# Patient Record
Sex: FEMALE | Employment: OTHER | URBAN - METROPOLITAN AREA
[De-identification: names, ages, dates, MRNs, and addresses within clinical notes are randomized per-mention and may not be internally consistent; named-entity substitution may affect disease eponyms.]

---

## 2018-09-07 ENCOUNTER — HOSPITAL ENCOUNTER (EMERGENCY)
Age: 43
Discharge: HOME | End: 2018-09-07
Attending: EMERGENCY MEDICINE
Payer: MEDICARE

## 2018-09-07 ENCOUNTER — APPOINTMENT (OUTPATIENT)
Dept: GENERAL RADIOLOGY | Age: 43
End: 2018-09-07
Attending: NURSE PRACTITIONER
Payer: MEDICARE

## 2018-09-07 VITALS
HEIGHT: 65 IN | TEMPERATURE: 98.3 F | HEART RATE: 70 BPM | RESPIRATION RATE: 16 BRPM | WEIGHT: 237 LBS | DIASTOLIC BLOOD PRESSURE: 101 MMHG | SYSTOLIC BLOOD PRESSURE: 148 MMHG | OXYGEN SATURATION: 100 % | BODY MASS INDEX: 39.49 KG/M2

## 2018-09-07 DIAGNOSIS — J02.9 SORE THROAT: ICD-10-CM

## 2018-09-07 DIAGNOSIS — J01.10 ACUTE NON-RECURRENT FRONTAL SINUSITIS: ICD-10-CM

## 2018-09-07 DIAGNOSIS — J06.9 URI WITH COUGH AND CONGESTION: ICD-10-CM

## 2018-09-07 DIAGNOSIS — H65.92 LEFT NON-SUPPURATIVE OTITIS MEDIA: Primary | ICD-10-CM

## 2018-09-07 PROCEDURE — 71046 X-RAY EXAM CHEST 2 VIEWS: CPT

## 2018-09-07 PROCEDURE — 99282 EMERGENCY DEPT VISIT SF MDM: CPT

## 2018-09-07 RX ORDER — BENZONATATE 100 MG/1
100 CAPSULE ORAL
Qty: 30 CAP | Refills: 0 | Status: SHIPPED | OUTPATIENT
Start: 2018-09-07 | End: 2018-09-14

## 2018-09-07 RX ORDER — OXYCODONE AND ACETAMINOPHEN 7.5; 325 MG/1; MG/1
TABLET ORAL
COMMUNITY
End: 2020-02-02

## 2018-09-07 RX ORDER — AZITHROMYCIN 250 MG/1
TABLET, FILM COATED ORAL
Qty: 6 TAB | Refills: 0 | Status: SHIPPED | OUTPATIENT
Start: 2018-09-07 | End: 2018-09-12

## 2018-09-07 NOTE — ED TRIAGE NOTES
C/o productive cough with yellow/green sputum, sore throat, nasal congestion, headaches, chills x3 days.

## 2018-09-08 NOTE — DISCHARGE INSTRUCTIONS
Drinking warm liquids, gargling with warm salt water, and throat lozenges may help with symptoms. An allergy medication combined with a decongestant (Allegra-D, Claritin-D, etc) should be taken daily. Saline nasal sprays or Net-Pots can be purchased over the counter to help reduce nasal congestion. Get rest and take a multivitamin daily to boost the immune system. Return to ED for any worsening or new symptoms such as throat swelling, high fevers, shortness of breath, vomiting, or if symptoms do not improve. Saline Nasal Washes: Care Instructions  Your Care Instructions  Saline nasal washes help keep the nasal passages open by washing out thick or dried mucus. This simple remedy can help relieve symptoms of allergies, sinusitis, and colds. It also can make the nose feel more comfortable by keeping the mucous membranes moist. You may notice a little burning sensation in your nose the first few times you use the solution, but this usually gets better in a few days. Follow-up care is a key part of your treatment and safety. Be sure to make and go to all appointments, and call your doctor if you are having problems. It's also a good idea to know your test results and keep a list of the medicines you take. How can you care for yourself at home? · You can buy premixed saline solution in a squeeze bottle or other sinus rinse products at a drugstore. Read and follow the instructions on the label. · You also can make your own saline solution by adding 1 teaspoon of salt and 1 teaspoon of baking soda to 2 cups of distilled water. · If you use a homemade solution, pour a small amount into a clean bowl. Using a rubber bulb syringe, squeeze the syringe and place the tip in the salt water. Pull a small amount of the salt water into the syringe by relaxing your hand. · Sit down with your head tilted slightly back. Do not lie down.  Put the tip of the bulb syringe or the squeeze bottle a little way into one of your nostrils. Gently drip or squirt a few drops into the nostril. Repeat with the other nostril. Some sneezing and gagging are normal at first.  · Gently blow your nose. · Wipe the syringe or bottle tip clean after each use. · Repeat this 2 or 3 times a day. · Use nasal washes gently if you have nosebleeds often. When should you call for help? Watch closely for changes in your health, and be sure to contact your doctor if:    · You often get nosebleeds.     · You have problems doing the nasal washes. Where can you learn more? Go to http://jenna-lola.info/. Enter 549 [id number] [id number] 47 in the search box to learn more about \"Saline Nasal Washes: Care Instructions. \"  Current as of: May 12, 2017  Content Version: 11.7  © 4720-9681 [employer]. Care instructions adapted under license by [employer] (which disclaims liability or warranty for this information). If you have questions about a medical condition or this instruction, always ask your healthcare professional. [place] [address] any warranty or liability for your use of this information. Sore Throat: Care Instructions  Your Care Instructions    Infection by bacteria or a virus causes most sore throats. Cigarette smoke, dry air, air pollution, allergies, and yelling can also cause a sore throat. Sore throats can be painful and annoying. Fortunately, most sore throats go away on their own. If you have a bacterial infection, your doctor may prescribe antibiotics. Follow-up care is a key part of your treatment and safety. Be sure to make and go to all appointments, and call your doctor if you are having problems. It's also a good idea to know your test results and keep a list of the medicines you take. How can you care for yourself at home? · If your doctor prescribed antibiotics, take them as directed. Do not stop taking them just because you feel better.  You need to take the full course of antibiotics. · Gargle with warm salt water once an hour to help reduce swelling and relieve discomfort. Use 1 teaspoon of salt mixed in 1 cup of warm water. · Take an over-the-counter pain medicine, such as acetaminophen (Tylenol), ibuprofen (Advil, Motrin), or naproxen (Aleve). Read and follow all instructions on the label. · Be careful when taking over-the-counter cold or flu medicines and Tylenol at the same time. Many of these medicines have acetaminophen, which is Tylenol. Read the labels to make sure that you are not taking more than the recommended dose. Too much acetaminophen (Tylenol) can be harmful. · Drink plenty of fluids. Fluids may help soothe an irritated throat. Hot fluids, such as tea or soup, may help decrease throat pain. · Use over-the-counter throat lozenges to soothe pain. Regular cough drops or hard candy may also help. These should not be given to young children because of the risk of choking. · Do not smoke or allow others to smoke around you. If you need help quitting, talk to your doctor about stop-smoking programs and medicines. These can increase your chances of quitting for good. · Use a vaporizer or humidifier to add moisture to your bedroom. Follow the directions for cleaning the machine. When should you call for help? Call your doctor now or seek immediate medical care if:    · You have new or worse trouble swallowing.     · Your sore throat gets much worse on one side.    Watch closely for changes in your health, and be sure to contact your doctor if you do not get better as expected. Where can you learn more? Go to http://jenna-lola.info/. Enter [phone] [id number] [phone] in the search box to learn more about \"Sore Throat: Care Instructions. \"  Current as of: May 12, 2017  Content Version: 11.7  © 9316-7444 [employer]. Care instructions adapted under license by [employer] (which disclaims liability or warranty for this information).  If you have questions about a medical condition or this instruction, always ask your healthcare professional. [place] [address] any warranty or liability for your use of this information.

## 2018-09-08 NOTE — ED PROVIDER NOTES
EMERGENCY DEPARTMENT HISTORY AND PHYSICAL EXAM 
 
9:44 PM 
 
 
Date: 9/7/2018 Patient Name: Power Velasquez History of Presenting Illness Chief Complaint Patient presents with  Cough  Headache History Provided By: Patient Chief Complaint: URI symptosm Duration: 3 Days Timing:  Acute Additional History (Context): Power Velasquez is a 41 y.o. female with No significant past medical history who presents with cough, ear pain, sore throat, headaches, sinus congestion x 3 days. Headaches are frontal and a/w sinuses. Pain is worse leaning over. She denies fevers. Her eyes are watering./  She has not taken any medication. She denies vomiting, diarrhea. PCP: Denis Sotelo MD 
 
Current Outpatient Prescriptions Medication Sig Dispense Refill  losartan/hydrochlorothiazide (HYZAAR PO) Take  by mouth.  oxyCODONE-acetaminophen (PERCOCET) 7.5-325 mg per tablet Take  by mouth.  GABAPENTIN PO Take  by mouth.  quetiapine fumarate (SEROQUEL PO) Take  by mouth.  tramadol HCl (TRAMADOL PO) Take  by mouth.  linaclotide (LINZESS PO) Take  by mouth.  celecoxib (CELEBREX PO) Take  by mouth.  azithromycin (ZITHROMAX Z-RENÉ) 250 mg tablet Take 2 tablets on day 1, and 1 tablet on days 2-5. 6 Tab 0  
 benzonatate (TESSALON PERLES) 100 mg capsule Take 1 Cap by mouth three (3) times daily as needed for Cough for up to 7 days. 30 Cap 0 Past History Past Medical History: 
Past Medical History:  
Diagnosis Date  ADHD  Bipolar disorder ([address]  Carpal tunnel syndrome  Fibromyalgia  Hypertension  Irregular heart beat  Rheumatoid arthritis ([address] Past Surgical History: 
Past Surgical History:  
Procedure Laterality Date  HX HYSTERECTOMY Family History: 
History reviewed. No pertinent family history. Social History: 
Social History Substance Use Topics  Smoking status: Current Every Day Smoker Packs/day: 0.25  Smokeless tobacco: None  Alcohol use Yes Comment: rarely Allergies: Allergies Allergen Reactions  Aspirin Hives  Pcn [Penicillins] Hives Review of Systems Review of Systems Constitutional: Negative for fever. HENT: Positive for congestion, ear pain, rhinorrhea, sinus pain, sinus pressure and sore throat. Negative for facial swelling. Eyes: Negative for visual disturbance. Respiratory: Positive for cough. Negative for shortness of breath and wheezing. Cardiovascular: Negative for chest pain. Gastrointestinal: Negative for abdominal pain, diarrhea, nausea and vomiting. Genitourinary: Negative for dysuria. Musculoskeletal: Negative for neck pain. Skin: Negative for rash. Neurological: Negative for dizziness. Psychiatric/Behavioral: Negative for confusion. All other systems reviewed and are negative. Physical Exam  
 
Visit Vitals  BP (!) 148/101 (BP 1 Location: Right arm, BP Patient Position: Sitting)  Pulse 70  Temp 98.3 °F (36.8 °C)  Resp 16  
 Ht 5' 5\" (1.651 m)  Wt 107.5 kg (237 lb)  SpO2 100%  BMI 39.44 kg/m2 Physical Exam  
Constitutional: She is oriented to person, place, and time. She appears well-developed and well-nourished. HENT:  
Head: Normocephalic. Right Ear: Tympanic membrane, external ear and ear canal normal.  
Left Ear: Tympanic membrane, external ear and ear canal normal.  
Nose: Nose normal. Right sinus exhibits no maxillary sinus tenderness and no frontal sinus tenderness. Left sinus exhibits no maxillary sinus tenderness and no frontal sinus tenderness. Mouth/Throat: Uvula is midline, oropharynx is clear and moist and mucous membranes are normal. No oropharyngeal exudate, posterior oropharyngeal edema, posterior oropharyngeal erythema or tonsillar abscesses.   
Left Otitis media; sinus tenderness frontal   
Eyes: Conjunctivae are normal.  
 Neck: Normal range of motion. Neck supple. Cardiovascular: Normal rate, regular rhythm and normal heart sounds. Pulmonary/Chest: Effort normal and breath sounds normal.  
Musculoskeletal: Normal range of motion. Lymphadenopathy:  
  She has no cervical adenopathy. Neurological: She is alert and oriented to person, place, and time. Skin: Skin is warm and dry. Psychiatric: She has a normal mood and affect. Nursing note and vitals reviewed. Diagnostic Study Results Labs - No results found for this or any previous visit (from the past 12 hour(s)). Radiologic Studies -  
XR CHEST PA LAT    (Results Pending) Medical Decision Making I am the first provider for this patient. I reviewed the vital signs, available nursing notes, past medical history, past surgical history, family history and social history. Vital Signs-Reviewed the patient's vital signs. Records Reviewed: Nursing Notes (Time of Review: 9:44 PM) 
 
ED Course: Progress Notes, Reevaluation, and Consults: 
 
 
Provider Notes (Medical Decision Making): MDM Number of Diagnoses or Management Options Acute non-recurrent frontal sinusitis:  
Left non-suppurative otitis media:  
Sore throat: URI with cough and congestion:  
Diagnosis management comments: Left OM. Sinus tenderness. Afebrile. Cough productive./   Cover with abx. Diagnosis Clinical Impression: 1. Left non-suppurative otitis media 2. URI with cough and congestion 3. Acute non-recurrent frontal sinusitis 4. Sore throat Disposition:  
 
Follow-up Information Follow up With Details Comments Contact Info Zacarias Law MD In 3 days If symptoms do not improve [address] [facility] 
[address] [facility] [address] 
[phone] [facility] EMERGENCY DEPT  Immediately if symptoms worsen [address] 
[phone] Discharge Medication List as of 9/7/2018  9:22 PM  
  
 START taking these medications Details  
azithromycin (ZITHROMAX Z-RENÉ) 250 mg tablet Take 2 tablets on day 1, and 1 tablet on days 2-5., Print, Disp-6 Tab, R-0  
  
benzonatate (TESSALON PERLES) 100 mg capsule Take 1 Cap by mouth three (3) times daily as needed for Cough for up to 7 days. , Print, Disp-30 Cap, R-0  
  
  
CONTINUE these medications which have NOT CHANGED Details  
losartan/hydrochlorothiazide (HYZAAR PO) Take  by mouth., Historical Med  
  
oxyCODONE-acetaminophen (PERCOCET) 7.5-325 mg per tablet Take  by mouth., Historical Med GABAPENTIN PO Take  by mouth., Historical Med  
  
quetiapine fumarate (SEROQUEL PO) Take  by mouth., Historical Med  
  
tramadol HCl (TRAMADOL PO) Take  by mouth., Historical Med  
  
linaclotide (LINZESS PO) Take  by mouth., Historical Med  
  
celecoxib (CELEBREX PO) Take  by mouth., Historical Med  
  
  
 
_______________________________ Attestations: 
Scribe Attestation Teddy Manley PA-C acting as a scribe for and in the presence of [employer], [employer] September 07, 2018 at 9:47 PM 
    
Provider Attestation:     
I personally performed the services described in the documentation, reviewed the documentation, as recorded by the scribe in my presence, and it accurately and completely records my words and actions. September 07, 2018 at 9:47 PM - SILAS Lovell 
_______________________________

## 2018-11-24 ENCOUNTER — HOSPITAL ENCOUNTER (EMERGENCY)
Age: 43
Discharge: HOME | End: 2018-11-24
Attending: EMERGENCY MEDICINE
Payer: MEDICARE

## 2018-11-24 ENCOUNTER — APPOINTMENT (OUTPATIENT)
Dept: GENERAL RADIOLOGY | Age: 43
End: 2018-11-24
Attending: EMERGENCY MEDICINE
Payer: MEDICARE

## 2018-11-24 VITALS
HEART RATE: 72 BPM | TEMPERATURE: 98.2 F | OXYGEN SATURATION: 100 % | RESPIRATION RATE: 18 BRPM | SYSTOLIC BLOOD PRESSURE: 171 MMHG | HEIGHT: 65 IN | WEIGHT: 237 LBS | DIASTOLIC BLOOD PRESSURE: 93 MMHG | BODY MASS INDEX: 39.49 KG/M2

## 2018-11-24 DIAGNOSIS — R07.9 CHEST PAIN, UNSPECIFIED TYPE: Primary | ICD-10-CM

## 2018-11-24 LAB
ALBUMIN SERPL-MCNC: 3.3 G/DL (ref 3.4–5)
ALBUMIN/GLOB SERPL: 0.9 {RATIO} (ref 0.8–1.7)
ALP SERPL-CCNC: 101 U/L (ref 45–117)
ALT SERPL-CCNC: 20 U/L (ref 13–56)
ANION GAP SERPL CALC-SCNC: 8 MMOL/L (ref 3–18)
AST SERPL-CCNC: 12 U/L (ref 15–37)
BASOPHILS # BLD: 0.1 K/UL (ref 0–0.1)
BASOPHILS NFR BLD: 0 % (ref 0–2)
BILIRUB SERPL-MCNC: 0.2 MG/DL (ref 0.2–1)
BUN SERPL-MCNC: 14 MG/DL (ref 7–18)
BUN/CREAT SERPL: 11 (ref 12–20)
CALCIUM SERPL-MCNC: 8.1 MG/DL (ref 8.5–10.1)
CHLORIDE SERPL-SCNC: 110 MMOL/L (ref 100–108)
CK SERPL-CCNC: 99 U/L (ref 26–192)
CO2 SERPL-SCNC: 25 MMOL/L (ref 21–32)
CREAT SERPL-MCNC: 1.22 MG/DL (ref 0.6–1.3)
DIFFERENTIAL METHOD BLD: ABNORMAL
EOSINOPHIL # BLD: 0.4 K/UL (ref 0–0.4)
EOSINOPHIL NFR BLD: 4 % (ref 0–5)
ERYTHROCYTE [DISTWIDTH] IN BLOOD BY AUTOMATED COUNT: 12.9 % (ref 11.6–14.5)
GLOBULIN SER CALC-MCNC: 3.5 G/DL (ref 2–4)
GLUCOSE SERPL-MCNC: 123 MG/DL (ref 74–99)
HCG SERPL-ACNC: <1 MIU/ML (ref 0–10)
HCT VFR BLD AUTO: 41.2 % (ref 35–45)
HGB BLD-MCNC: 14 G/DL (ref 12–16)
LYMPHOCYTES # BLD: 4.4 K/UL (ref 0.9–3.6)
LYMPHOCYTES NFR BLD: 38 % (ref 21–52)
MCH RBC QN AUTO: 30.5 PG (ref 24–34)
MCHC RBC AUTO-ENTMCNC: 34 G/DL (ref 31–37)
MCV RBC AUTO: 89.8 FL (ref 74–97)
MONOCYTES # BLD: 0.6 K/UL (ref 0.05–1.2)
MONOCYTES NFR BLD: 5 % (ref 3–10)
NEUTS SEG # BLD: 6 K/UL (ref 1.8–8)
NEUTS SEG NFR BLD: 53 % (ref 40–73)
PLATELET # BLD AUTO: 263 K/UL (ref 135–420)
PMV BLD AUTO: 11.1 FL (ref 9.2–11.8)
POTASSIUM SERPL-SCNC: 3.3 MMOL/L (ref 3.5–5.5)
PROT SERPL-MCNC: 6.8 G/DL (ref 6.4–8.2)
RBC # BLD AUTO: 4.59 M/UL (ref 4.2–5.3)
SODIUM SERPL-SCNC: 143 MMOL/L (ref 136–145)
TROPONIN I SERPL-MCNC: <0.02 NG/ML (ref 0–0.04)
WBC # BLD AUTO: 11.5 K/UL (ref 4.6–13.2)

## 2018-11-24 PROCEDURE — 82550 ASSAY OF CK (CPK): CPT

## 2018-11-24 PROCEDURE — 71045 X-RAY EXAM CHEST 1 VIEW: CPT

## 2018-11-24 PROCEDURE — 99285 EMERGENCY DEPT VISIT HI MDM: CPT

## 2018-11-24 PROCEDURE — 84702 CHORIONIC GONADOTROPIN TEST: CPT

## 2018-11-24 PROCEDURE — 85025 COMPLETE CBC W/AUTO DIFF WBC: CPT

## 2018-11-24 PROCEDURE — 93005 ELECTROCARDIOGRAM TRACING: CPT

## 2018-11-24 PROCEDURE — 84484 ASSAY OF TROPONIN QUANT: CPT

## 2018-11-24 PROCEDURE — 80053 COMPREHEN METABOLIC PANEL: CPT

## 2018-11-24 RX ORDER — NITROGLYCERIN 0.4 MG/1
0.4 TABLET SUBLINGUAL
Status: DISCONTINUED | OUTPATIENT
Start: 2018-11-24 | End: 2018-11-24

## 2018-11-25 LAB
ATRIAL RATE: 63 BPM
CALCULATED P AXIS, ECG09: 37 DEGREES
CALCULATED R AXIS, ECG10: 25 DEGREES
CALCULATED T AXIS, ECG11: 27 DEGREES
DIAGNOSIS, 93000: NORMAL
P-R INTERVAL, ECG05: 168 MS
Q-T INTERVAL, ECG07: 448 MS
QRS DURATION, ECG06: 82 MS
QTC CALCULATION (BEZET), ECG08: 458 MS
VENTRICULAR RATE, ECG03: 63 BPM

## 2018-11-25 NOTE — ED PROVIDER NOTES
Emergency Department Treatment Report Patient: Sorin Spivey Age: 41 y.o. Sex: female YOB: 1975 Admit Date: 11/24/2018 PCP: Sonu Taylor MD  
MRN: [id number]  CSN: [id number] Room: CARY/CARY Time Dictated: 10:14 PM   
 
Chief Complaint Chief Complaint Patient presents with  Chest Pain History of Present Illness 41 y.o. female, PMH of HTN, Fibromyalgia, Bipolar Disorder, ADHD and irregular heartbeat presents with acute, moderate CP in the upper chest onset two hours ago. The patient claims that she feels a tightness in the chest that she has never experienced before. She took a stress test one month prior. The patient denies SOB. She has taken NTG before, which helps alleviate the pain, but ASA causes her to break out in hives. No other concerns or symptoms at this time. Review of Systems Constitutional: No fever, chills, or weight loss Eyes: No visual symptoms. ENT: No sore throat, runny nose or ear pain. Respiratory: No cough, dyspnea or wheezing. No SOB. Cardiovascular: Positive for chest pain and chset tightness. No pressure, palpitations, or heaviness. Gastrointestinal: No vomiting, diarrhea or abdominal pain. Genitourinary: No dysuria, frequency, or urgency. Musculoskeletal: No joint pain or swelling. Integumentary: No rashes. Neurological: No headaches, sensory or motor symptoms. Denies complaints in all other systems. Past Medical/Surgical History Past Medical History:  
Diagnosis Date  ADHD  Bipolar disorder ([facility] [address]  Carpal tunnel syndrome  Fibromyalgia  Hypertension  Irregular heart beat  Rheumatoid arthritis ([facility] [address] Past Surgical History:  
Procedure Laterality Date  HX HYSTERECTOMY Social History Social History Socioeconomic History  Marital status: SINGLE Spouse name: Not on file  Number of children: Not on file  Years of education: Not on file  Highest education level: Not on file Tobacco Use  Smoking status: Current Every Day Smoker Packs/day: 0.25 Substance and Sexual Activity  Alcohol use: Yes Comment: rarely  Drug use: No  
 
 
Family History History reviewed. No pertinent family history. Home Medications Prior to Admission Medications Prescriptions Last Dose Informant Patient Reported? Taking? GABAPENTIN PO   Yes No  
Sig: Take  by mouth. celecoxib (CELEBREX PO)   Yes No  
Sig: Take  by mouth.  
linaclotide (LINZESS PO)   Yes No  
Sig: Take  by mouth.  
losartan/hydrochlorothiazide (HYZAAR PO)   Yes No  
Sig: Take  by mouth. oxyCODONE-acetaminophen (PERCOCET) 7.5-325 mg per tablet   Yes No  
Sig: Take  by mouth.  
quetiapine fumarate (SEROQUEL PO)   Yes No  
Sig: Take  by mouth. tramadol HCl (TRAMADOL PO)   Yes No  
Sig: Take  by mouth. Facility-Administered Medications: None Allergies Allergies Allergen Reactions  Aspirin Hives  Pcn [Penicillins] Hives Physical Exam  
 
ED Triage Vitals [11/24/18 2204] ED Encounter Vitals Group BP (!) 162/98 Pulse (Heart Rate) 66 Resp Rate 18 Temp 98.2 °F (36.8 °C) Temp src O2 Sat (%) 100 % Weight 237 lb Height 5' 5\" Constitutional: Patient appears well developed and well nourished. Appearance and behavior are age and situation appropriate. HEENT: Conjunctiva clear. PERRLA. Mucous membranes moist, non-erythematous. Surface of the pharynx, palate, and tongue are pink, moist and without lesions. Neck: supple, non tender, symmetrical, no masses or JVD. Respiratory: lungs clear to auscultation, nonlabored respirations. No tachypnea or accessory muscle use. Cardiovascular: heart regular rate and rhythm without murmur rubs or gallops. Calves soft and non-tender. Distal pulses 2+ and equal bilaterally. No peripheral edema.   
Gastrointestinal:  Abdomen soft, nontender without complaint of pain to palpation Musculoskeletal: Nail beds pink with prompt capillary refill Integumentary: warm and dry without rashes or lesions Neurologic: alert and oriented, Sensation intact, motor strength equal and symmetric. No facial asymmetry or dysarthria. Diagnostic Studies Lab:  
Recent Results (from the past 12 hour(s)) EKG, 12 LEAD, INITIAL Collection Time: 11/24/18  9:57 PM  
Result Value Ref Range Ventricular Rate 63 BPM  
 Atrial Rate 63 BPM  
 P-R Interval 168 ms QRS Duration 82 ms Q-T Interval 448 ms QTC Calculation (Bezet) 458 ms Calculated P Axis 37 degrees Calculated R Axis 25 degrees Calculated T Axis 27 degrees Diagnosis Normal sinus rhythm Cannot rule out Anterior infarct , age undetermined Abnormal ECG No previous ECGs available CBC WITH AUTOMATED DIFF Collection Time: 11/24/18 10:05 PM  
Result Value Ref Range WBC 11.5 4.6 - 13.2 K/uL  
 RBC 4.59 4.20 - 5.30 M/uL  
 HGB 14.0 12.0 - 16.0 g/dL HCT 41.2 35.0 - 45.0 % MCV 89.8 74.0 - 97.0 FL  
 MCH 30.5 24.0 - 34.0 PG  
 MCHC 34.0 31.0 - 37.0 g/dL  
 RDW 12.9 11.6 - 14.5 % PLATELET 209 739 - 362 K/uL MPV 11.1 9.2 - 11.8 FL  
 NEUTROPHILS 53 40 - 73 % LYMPHOCYTES 38 21 - 52 % MONOCYTES 5 3 - 10 % EOSINOPHILS 4 0 - 5 % BASOPHILS 0 0 - 2 %  
 ABS. NEUTROPHILS 6.0 1.8 - 8.0 K/UL  
 ABS. LYMPHOCYTES 4.4 (H) 0.9 - 3.6 K/UL  
 ABS. MONOCYTES 0.6 0.05 - 1.2 K/UL  
 ABS. EOSINOPHILS 0.4 0.0 - 0.4 K/UL  
 ABS. BASOPHILS 0.1 0.0 - 0.1 K/UL  
 DF AUTOMATED METABOLIC PANEL, COMPREHENSIVE Collection Time: 11/24/18 10:05 PM  
Result Value Ref Range Sodium 143 136 - 145 mmol/L Potassium 3.3 (L) 3.5 - 5.5 mmol/L Chloride 110 (H) 100 - 108 mmol/L  
 CO2 25 21 - 32 mmol/L Anion gap 8 3.0 - 18 mmol/L Glucose 123 (H) 74 - 99 mg/dL BUN 14 7.0 - 18 MG/DL Creatinine 1.22 0.6 - 1.3 MG/DL  
 BUN/Creatinine ratio 11 (L) 12 - 20 GFR est AA 59 (L) >60 ml/min/1.73m2 GFR est non-AA 49 (L) >60 ml/min/1.73m2 Calcium 8.1 (L) 8.5 - 10.1 MG/DL Bilirubin, total 0.2 0.2 - 1.0 MG/DL  
 ALT (SGPT) 20 13 - 56 U/L  
 AST (SGOT) 12 (L) 15 - 37 U/L Alk. phosphatase 101 45 - 117 U/L Protein, total 6.8 6.4 - 8.2 g/dL Albumin 3.3 (L) 3.4 - 5.0 g/dL Globulin 3.5 2.0 - 4.0 g/dL A-G Ratio 0.9 0.8 - 1.7    
TROPONIN I Collection Time: 11/24/18 10:05 PM  
Result Value Ref Range Troponin-I, Qt. <0.02 0.0 - 0.045 NG/ML  
CK Collection Time: 11/24/18 10:05 PM  
Result Value Ref Range CK 99 26 - 192 U/L  
BETA HCG, QT Collection Time: 11/24/18 10:05 PM  
Result Value Ref Range Beta HCG, QT <1 0 - 10 MIU/ML Imaging: No results found. [facility] 
 
ED Course/Medical Decision Making Patient's chest pain is atypical in nature. Her HEART score is 2. She is PERC negative and symptoms are not typical for aortic dissection. Chest pain quickly resolved and she requested discharge. Instructed her to f/u with her PMD and Cardiologist. 
 
Medications - No data to display Final Diagnosis ICD-10-CM ICD-9-CM 1. Chest pain, unspecified type R07.9 786.50 Disposition Patient is discharged home in stable condition. Advised to follow with their primary care physician. Patient advised to return to the ER for any new or worsening symptoms. My Medications CONTINUE taking these medications Instructions Each Dose to Equal Morning Noon Evening Bedtime CELEBREX PO Your last dose was: Your next dose is: Take  by mouth. GABAPENTIN PO Your last dose was: Your next dose is: Take  by mouth. HYZAAR PO Your last dose was: Your next dose is: Take  by mouth. LINZESS PO Your last dose was: Your next dose is: Take  by mouth. PERCOCET 7.5-325 mg per tablet Generic drug:  oxyCODONE-acetaminophen Your last dose was: Your next dose is: Take  by mouth. SEROQUEL PO Your last dose was: Your next dose is: Take  by mouth. TRAMADOL PO Your last dose was: Your next dose is: Take  by mouth. Tila Gaspar MD 
November 24, 2018 My signature above authenticates this document and my orders, the final   
diagnosis (es), discharge prescription (s), and instructions in the Epic   
record. If you have any questions please contact [phone]. 
  
Nursing notes have been reviewed by the physician/ advanced practice   
Clinician. Scribe Attestation Tsering Hennessy acting as a scribe for and in the presence of Jayden Russo MD     
November 24, 2018 at 11:23 PM 
    
Provider Attestation:     
I personally performed the services described in the documentation, reviewed the documentation, as recorded by the scribe in my presence, and it accurately and completely records my words and actions.  November 24, 2018 at 11:23 PM - Jayden Russo MD

## 2018-11-25 NOTE — DISCHARGE INSTRUCTIONS

## 2018-11-25 NOTE — ED NOTES
Pt requested to have her IV removed and states \"I'm fine and I am ready to go\". Pt IV removed and patient left prior to discharge instructions

## 2020-02-02 ENCOUNTER — APPOINTMENT (OUTPATIENT)
Dept: GENERAL RADIOLOGY | Age: 45
End: 2020-02-02
Attending: PHYSICIAN ASSISTANT
Payer: MEDICARE

## 2020-02-02 ENCOUNTER — HOSPITAL ENCOUNTER (EMERGENCY)
Age: 45
Discharge: HOME | End: 2020-02-02
Attending: EMERGENCY MEDICINE | Admitting: EMERGENCY MEDICINE
Payer: MEDICARE

## 2020-02-02 VITALS
WEIGHT: 293 LBS | BODY MASS INDEX: 48.82 KG/M2 | DIASTOLIC BLOOD PRESSURE: 99 MMHG | OXYGEN SATURATION: 99 % | SYSTOLIC BLOOD PRESSURE: 156 MMHG | HEART RATE: 70 BPM | RESPIRATION RATE: 15 BRPM | TEMPERATURE: 97.5 F | HEIGHT: 65 IN

## 2020-02-02 DIAGNOSIS — R07.9 ACUTE CHEST PAIN: Primary | ICD-10-CM

## 2020-02-02 LAB
ALBUMIN SERPL-MCNC: 3.4 G/DL (ref 3.4–5)
ALBUMIN/GLOB SERPL: 0.7 {RATIO} (ref 0.8–1.7)
ALP SERPL-CCNC: 104 U/L (ref 45–117)
ALT SERPL-CCNC: 28 U/L (ref 13–56)
ANION GAP SERPL CALC-SCNC: 3 MMOL/L (ref 3–18)
AST SERPL-CCNC: 16 U/L (ref 10–38)
BASOPHILS # BLD: 0 K/UL (ref 0–0.1)
BASOPHILS NFR BLD: 0 % (ref 0–2)
BILIRUB SERPL-MCNC: 0.2 MG/DL (ref 0.2–1)
BUN SERPL-MCNC: 8 MG/DL (ref 7–18)
BUN/CREAT SERPL: 7 (ref 12–20)
CALCIUM SERPL-MCNC: 8.7 MG/DL (ref 8.5–10.1)
CHLORIDE SERPL-SCNC: 107 MMOL/L (ref 100–111)
CO2 SERPL-SCNC: 29 MMOL/L (ref 21–32)
CREAT SERPL-MCNC: 1.2 MG/DL (ref 0.6–1.3)
DIFFERENTIAL METHOD BLD: ABNORMAL
EOSINOPHIL # BLD: 0.4 K/UL (ref 0–0.4)
EOSINOPHIL NFR BLD: 3 % (ref 0–5)
ERYTHROCYTE [DISTWIDTH] IN BLOOD BY AUTOMATED COUNT: 14.3 % (ref 11.6–14.5)
GLOBULIN SER CALC-MCNC: 4.7 G/DL (ref 2–4)
GLUCOSE SERPL-MCNC: 98 MG/DL (ref 74–99)
HCG SERPL QL: NEGATIVE
HCT VFR BLD AUTO: 43.1 % (ref 35–45)
HGB BLD-MCNC: 14.5 G/DL (ref 12–16)
LYMPHOCYTES # BLD: 4.4 K/UL (ref 0.9–3.6)
LYMPHOCYTES NFR BLD: 38 % (ref 21–52)
MAGNESIUM SERPL-MCNC: 2.1 MG/DL (ref 1.6–2.6)
MCH RBC QN AUTO: 31.2 PG (ref 24–34)
MCHC RBC AUTO-ENTMCNC: 33.6 G/DL (ref 31–37)
MCV RBC AUTO: 92.7 FL (ref 74–97)
MONOCYTES # BLD: 0.6 K/UL (ref 0.05–1.2)
MONOCYTES NFR BLD: 5 % (ref 3–10)
NEUTS SEG # BLD: 6 K/UL (ref 1.8–8)
NEUTS SEG NFR BLD: 54 % (ref 40–73)
PLATELET # BLD AUTO: 313 K/UL (ref 135–420)
PMV BLD AUTO: 11.4 FL (ref 9.2–11.8)
POTASSIUM SERPL-SCNC: 3.1 MMOL/L (ref 3.5–5.5)
PROT SERPL-MCNC: 8.1 G/DL (ref 6.4–8.2)
RBC # BLD AUTO: 4.65 M/UL (ref 4.2–5.3)
SODIUM SERPL-SCNC: 139 MMOL/L (ref 136–145)
TROPONIN I SERPL-MCNC: <0.02 NG/ML (ref 0–0.04)
WBC # BLD AUTO: 11.4 K/UL (ref 4.6–13.2)

## 2020-02-02 PROCEDURE — 74011250637 HC RX REV CODE- 250/637: Performed by: EMERGENCY MEDICINE

## 2020-02-02 PROCEDURE — 84703 CHORIONIC GONADOTROPIN ASSAY: CPT

## 2020-02-02 PROCEDURE — 80053 COMPREHEN METABOLIC PANEL: CPT

## 2020-02-02 PROCEDURE — 85025 COMPLETE CBC W/AUTO DIFF WBC: CPT

## 2020-02-02 PROCEDURE — 83735 ASSAY OF MAGNESIUM: CPT

## 2020-02-02 PROCEDURE — 99285 EMERGENCY DEPT VISIT HI MDM: CPT

## 2020-02-02 PROCEDURE — 74011000250 HC RX REV CODE- 250: Performed by: EMERGENCY MEDICINE

## 2020-02-02 PROCEDURE — 93005 ELECTROCARDIOGRAM TRACING: CPT

## 2020-02-02 PROCEDURE — 71045 X-RAY EXAM CHEST 1 VIEW: CPT

## 2020-02-02 PROCEDURE — 84484 ASSAY OF TROPONIN QUANT: CPT

## 2020-02-02 PROCEDURE — 96374 THER/PROPH/DIAG INJ IV PUSH: CPT

## 2020-02-02 PROCEDURE — 74011250636 HC RX REV CODE- 250/636: Performed by: EMERGENCY MEDICINE

## 2020-02-02 RX ORDER — AMLODIPINE BESYLATE 5 MG/1
5 TABLET ORAL
COMMUNITY
Start: 2018-09-22 | End: 2020-02-24 | Stop reason: ALTCHOICE

## 2020-02-02 RX ORDER — MAG HYDROX/ALUMINUM HYD/SIMETH 200-200-20
30 SUSPENSION, ORAL (FINAL DOSE FORM) ORAL
Qty: 769 ML | Refills: 0 | Status: SHIPPED | OUTPATIENT
Start: 2020-02-02 | End: 2020-02-24 | Stop reason: ALTCHOICE

## 2020-02-02 RX ORDER — FAMOTIDINE 10 MG/ML
20 INJECTION INTRAVENOUS
Status: COMPLETED | OUTPATIENT
Start: 2020-02-02 | End: 2020-02-02

## 2020-02-02 RX ORDER — MORPHINE SULFATE 2 MG/ML
2 INJECTION, SOLUTION INTRAMUSCULAR; INTRAVENOUS
Status: DISCONTINUED | OUTPATIENT
Start: 2020-02-02 | End: 2020-02-02

## 2020-02-02 RX ORDER — TRAMADOL HYDROCHLORIDE 50 MG/1
50 TABLET ORAL
Qty: 10 TAB | Refills: 0 | Status: SHIPPED | OUTPATIENT
Start: 2020-02-02 | End: 2020-02-05

## 2020-02-02 RX ORDER — OMEPRAZOLE 40 MG/1
40 CAPSULE, DELAYED RELEASE ORAL DAILY
Qty: 90 CAP | Refills: 3 | Status: SHIPPED | OUTPATIENT
Start: 2020-02-02

## 2020-02-02 RX ORDER — TIZANIDINE HYDROCHLORIDE 4 MG/1
CAPSULE, GELATIN COATED ORAL
COMMUNITY

## 2020-02-02 RX ORDER — ACETAMINOPHEN 500 MG
1000 TABLET ORAL
Qty: 50 TAB | Refills: 0 | Status: SHIPPED | OUTPATIENT
Start: 2020-02-02 | End: 2020-02-24 | Stop reason: ALTCHOICE

## 2020-02-02 RX ORDER — ONDANSETRON 2 MG/ML
4 INJECTION INTRAMUSCULAR; INTRAVENOUS
Status: DISCONTINUED | OUTPATIENT
Start: 2020-02-02 | End: 2020-02-02

## 2020-02-02 RX ORDER — OMEPRAZOLE 40 MG/1
CAPSULE, DELAYED RELEASE ORAL
COMMUNITY
End: 2020-02-02

## 2020-02-02 RX ADMIN — FAMOTIDINE 20 MG: 10 INJECTION, SOLUTION INTRAVENOUS at 19:15

## 2020-02-02 RX ADMIN — POTASSIUM BICARBONATE 40 MEQ: 782 TABLET, EFFERVESCENT ORAL at 19:15

## 2020-02-02 RX ADMIN — LIDOCAINE HYDROCHLORIDE 40 ML: 20 SOLUTION ORAL; TOPICAL at 19:15

## 2020-02-02 NOTE — ED TRIAGE NOTES
The patient has been having cardiac CP for the past 4 days. Also, pain is radiating to her arm and jaw. I performed a brief evaluation, including history and physical, of the patient here in triage and I have determined that pt will need further treatment and evaluation from the main side ER physician. I have placed initial orders to help in expediting patients care.      February 02, 2020 at 5:26 PM - Boston Cruz PA-C        Visit Vitals  /63 (BP 1 Location: Right arm, BP Patient Position: Sitting)   Pulse 67   Temp 97.5 °F (36.4 °C)   Resp 22   Ht 5' 5\" (1.651 m)   Wt 149.7 kg (330 lb)   SpO2 99%   BMI 54.91 kg/m²

## 2020-02-02 NOTE — DISCHARGE INSTRUCTIONS

## 2020-02-02 NOTE — ED PROVIDER NOTES
Ariane Vazquez is a 39 y.o. female with a history of obesity, hypertension, diabetes and smoking with complaints of intermittent central chest pain that radiates to her shoulder and arm for the last 4 to 5 days. Pain is there frequently and she wakes up with it in the morning. She denies any fever, chills, sweats. She has a nonproductive cough. She is occasionally short of breath. She has no new leg pain or swelling. She said no fever. She not taken thing for it. Patient's had a negative stress test before in the past.  She has no known history of coronary disease. She is never had any PE or DVT. It is made occasionally worse with eating. It is not necessarily exertional in nature. The history is provided by the patient and medical records. Past Medical History:   Diagnosis Date    ADHD     Bipolar disorder ([facility] [address]     Carpal tunnel syndrome     Fibromyalgia     Hypertension     Irregular heart beat     Rheumatoid arthritis ([facility] [address]        Past Surgical History:   Procedure Laterality Date    HX HYSTERECTOMY           History reviewed. No pertinent family history.     Social History     Socioeconomic History    Marital status: SINGLE     Spouse name: Not on file    Number of children: Not on file    Years of education: Not on file    Highest education level: Not on file   Occupational History    Not on file   Social Needs    Financial resource strain: Not on file    Food insecurity:     Worry: Not on file     Inability: Not on file    Transportation needs:     Medical: Not on file     Non-medical: Not on file   Tobacco Use    Smoking status: Current Every Day Smoker     Packs/day: 0.25   Substance and Sexual Activity    Alcohol use: Yes     Comment: rarely    Drug use: No    Sexual activity: Not on file   Lifestyle    Physical activity:     Days per week: Not on file     Minutes per session: Not on file    Stress: Not on file   Relationships    Social connections:     Talks on phone: Not on file     Gets together: Not on file     Attends Spiritism service: Not on file     Active member of club or organization: Not on file     Attends meetings of clubs or organizations: Not on file     Relationship status: Not on file    Intimate partner violence:     Fear of current or ex partner: Not on file     Emotionally abused: Not on file     Physically abused: Not on file     Forced sexual activity: Not on file   Other Topics Concern    Not on file   Social History Narrative    Not on file         ALLERGIES: Aspirin and Pcn [penicillins]    Review of Systems   Constitutional: Negative for fever. HENT: Negative for sore throat. Eyes: Negative for visual disturbance. Respiratory: Positive for cough. Cardiovascular: Positive for chest pain. Gastrointestinal: Negative for abdominal pain. Genitourinary: Negative for difficulty urinating. Musculoskeletal: Negative for gait problem. Skin: Negative for rash. Allergic/Immunologic: Negative for immunocompromised state. Neurological: Negative for syncope. Psychiatric/Behavioral: Positive for sleep disturbance. Vitals:    02/02/20 1718   BP: 115/63   Pulse: 67   Resp: 22   Temp: 97.5 °F (36.4 °C)   SpO2: 99%   Weight: 149.7 kg (330 lb)   Height: 5' 5\" (1.651 m)            Physical Exam  Vitals signs and nursing note reviewed. Constitutional:       General: She is not in acute distress. Appearance: She is well-developed. She is obese. She is not ill-appearing, toxic-appearing or diaphoretic. HENT:      Head: Normocephalic and atraumatic. Right Ear: External ear normal.      Left Ear: External ear normal.      Nose: Nose normal.      Mouth/Throat:      Pharynx: Uvula midline. Eyes:      General: No scleral icterus. Conjunctiva/sclera: Conjunctivae normal.   Neck:      Musculoskeletal: Neck supple. Cardiovascular:      Rate and Rhythm: Normal rate and regular rhythm. Heart sounds: Normal heart sounds.    Pulmonary:      Effort: Pulmonary effort is normal.      Breath sounds: Normal breath sounds. Chest:      Chest wall: No tenderness. Abdominal:      Palpations: Abdomen is soft. Tenderness: There is no abdominal tenderness. Skin:     General: Skin is warm and dry. Capillary Refill: Capillary refill takes less than 2 seconds. Neurological:      Mental Status: She is alert and oriented to person, place, and time.       Gait: Gait normal.   Psychiatric:         Behavior: Behavior normal.          MDM       Procedures  Vitals:  Patient Vitals for the past 12 hrs:   Temp Pulse Resp BP SpO2   02/02/20 1718 97.5 °F (36.4 °C) 67 22 115/63 99 %         Medications ordered:   Medications   mylanta/viscous lidocaine (GI COCKTAIL) (has no administration in time range)   famotidine (PF) (PEPCID) injection 20 mg (has no administration in time range)   potassium bicarb-citric acid (EFFER-K) tablet 40 mEq (has no administration in time range)         Lab findings:  Recent Results (from the past 12 hour(s))   EKG, 12 LEAD, INITIAL    Collection Time: 02/02/20  4:57 PM   Result Value Ref Range    Ventricular Rate 73 BPM    Atrial Rate 73 BPM    P-R Interval 180 ms    QRS Duration 74 ms    Q-T Interval 402 ms    QTC Calculation (Bezet) 442 ms    Calculated P Axis 55 degrees    Calculated R Axis 39 degrees    Calculated T Axis -150 degrees    Diagnosis       Normal sinus rhythm  T wave abnormality, consider inferolateral ischemia  Abnormal ECG  When compared with ECG of 24-NOV-2018 21:57,  T wave inversion now evident in Lateral leads     METABOLIC PANEL, COMPREHENSIVE    Collection Time: 02/02/20  6:00 PM   Result Value Ref Range    Sodium 139 136 - 145 mmol/L    Potassium 3.1 (L) 3.5 - 5.5 mmol/L    Chloride 107 100 - 111 mmol/L    CO2 29 21 - 32 mmol/L    Anion gap 3 3.0 - 18 mmol/L    Glucose 98 74 - 99 mg/dL    BUN 8 7.0 - 18 MG/DL    Creatinine 1.20 0.6 - 1.3 MG/DL    BUN/Creatinine ratio 7 (L) 12 - 20      GFR est AA 59 (L) >60 ml/min/1.73m2    GFR est non-AA 49 (L) >60 ml/min/1.73m2    Calcium 8.7 8.5 - 10.1 MG/DL    Bilirubin, total 0.2 0.2 - 1.0 MG/DL    ALT (SGPT) 28 13 - 56 U/L    AST (SGOT) 16 10 - 38 U/L    Alk. phosphatase 104 45 - 117 U/L    Protein, total 8.1 6.4 - 8.2 g/dL    Albumin 3.4 3.4 - 5.0 g/dL    Globulin 4.7 (H) 2.0 - 4.0 g/dL    A-G Ratio 0.7 (L) 0.8 - 1.7     CBC WITH AUTOMATED DIFF    Collection Time: 02/02/20  6:00 PM   Result Value Ref Range    WBC 11.4 4.6 - 13.2 K/uL    RBC 4.65 4.20 - 5.30 M/uL    HGB 14.5 12.0 - 16.0 g/dL    HCT 43.1 35.0 - 45.0 %    MCV 92.7 74.0 - 97.0 FL    MCH 31.2 24.0 - 34.0 PG    MCHC 33.6 31.0 - 37.0 g/dL    RDW 14.3 11.6 - 14.5 %    PLATELET 010 895 - 899 K/uL    MPV 11.4 9.2 - 11.8 FL    NEUTROPHILS 54 40 - 73 %    LYMPHOCYTES 38 21 - 52 %    MONOCYTES 5 3 - 10 %    EOSINOPHILS 3 0 - 5 %    BASOPHILS 0 0 - 2 %    ABS. NEUTROPHILS 6.0 1.8 - 8.0 K/UL    ABS. LYMPHOCYTES 4.4 (H) 0.9 - 3.6 K/UL    ABS. MONOCYTES 0.6 0.05 - 1.2 K/UL    ABS. EOSINOPHILS 0.4 0.0 - 0.4 K/UL    ABS. BASOPHILS 0.0 0.0 - 0.1 K/UL    DF AUTOMATED     TROPONIN I    Collection Time: 02/02/20  6:00 PM   Result Value Ref Range    Troponin-I, QT <0.02 0.0 - 0.045 NG/ML   MAGNESIUM    Collection Time: 02/02/20  6:00 PM   Result Value Ref Range    Magnesium 2.1 1.6 - 2.6 mg/dL   HCG QL SERUM    Collection Time: 02/02/20  6:00 PM   Result Value Ref Range    HCG, Ql. NEGATIVE  NEG         EKG interpretation by ED Physician:  Normal sinus rhythm with T wave abnormality in inferolateral leads. No acute ST changes. Rate 73 QRS 74   To be inversion now evident in lateral leads. Cardiac monitor: Normal rate with regular rhythm no ectopy  Pulse ox: 99% room air    X-Ray, CT or other radiology findings or impressions:  XR CHEST PORT    (Results Pending)   No acute process per my interpretation    Progress notes, Consult notes or additional Procedure notes:   No feel patient partial testing. Most likely reflux in nature. Not exertional chest pain.   Patient can follow-up as outpatient for further work-up    I have discussed with patient and/or family/sig other the results, interpretation of any imaging if performed, suspected diagnosis and treatment plan to include instructions regarding the diagnoses listed to which understanding was expressed with all questions answered      Reevaluation of patient:   stable    Disposition:  Diagnosis:   1. Acute chest pain        Disposition: home      Follow-up Information     Follow up With Specialties Details Why Contact Info    Marin Blanchard MD Internal Medicine Schedule an appointment as soon as possible for a visit this week for recheck in office and to get stress test set up or make appointment with cardiologist below [address]      Jacinto Vasquez MD Cardiology, Internal Medicine Schedule an appointment as soon as possible for a visit  [facility]  [facility] 942 [phone]      [facility] EMERGENCY DEPT Emergency Medicine  If symptoms worsen [address]  [phone]            Patient's Medications   Start Taking    ACETAMINOPHEN (TYLENOL EXTRA STRENGTH) 500 MG TABLET    Take 2 Tabs by mouth every six (6) hours as needed for Pain. ALUM-MAG HYDROXIDE-SIMETH (MYLANTA) 200-200-20 MG/5 ML SUSP    Take 30 mL by mouth four (4) times daily as needed for Pain. TRAMADOL (ULTRAM) 50 MG TABLET    Take 1 Tab by mouth every six (6) hours as needed for Pain for up to 3 days. Max Daily Amount: 200 mg. Continue Taking    AMLODIPINE (NORVASC) 5 MG TABLET    Take 5 mg by mouth. CELECOXIB (CELEBREX PO)    Take  by mouth. GABAPENTIN PO    Take  by mouth. LINACLOTIDE (LINZESS PO)    Take  by mouth. LOSARTAN/HYDROCHLOROTHIAZIDE (HYZAAR PO)    Take  by mouth. QUETIAPINE FUMARATE (SEROQUEL PO)    Take  by mouth.     TIZANIDINE (ZANAFLEX) 4 MG CAPSULE    tizanidine 4 mg capsule   These Medications have changed Modified Medication Previous Medication    OMEPRAZOLE (PRILOSEC) 40 MG CAPSULE omeprazole (PRILOSEC) 40 mg capsule       Take 1 Cap by mouth daily. omeprazole 40 mg capsule,delayed release   Stop Taking    OXYCODONE-ACETAMINOPHEN (PERCOCET) 7.5-325 MG PER TABLET    Take  by mouth. TRAMADOL HCL (TRAMADOL PO)    Take  by mouth.

## 2020-02-02 NOTE — LETTER
NOTIFICATION RETURN TO WORK / SCHOOL 
 
2/2/2020 6:58 PM 
 
Ms. Madeline Aguayo [address] To Whom It May Concern: Madeline Aguayo is currently under the care of [facility] EMERGENCY DEPT. She will return to work/school on: 2/4/20 If there are questions or concerns please have the patient contact our office. Sincerely, Rukhsana Francis MD

## 2020-02-02 NOTE — ED TRIAGE NOTES
Pt ambulatory to triage c/o chest pain left midsternal that radiates into left arm and throat x 4 days. States 1 episode of vomiting at that point, pain gets worse with movement and after eating.  States she thought It may be gas-ex for a few days with minimal relief

## 2020-02-03 LAB
ATRIAL RATE: 73 BPM
CALCULATED P AXIS, ECG09: 55 DEGREES
CALCULATED R AXIS, ECG10: 39 DEGREES
CALCULATED T AXIS, ECG11: -150 DEGREES
DIAGNOSIS, 93000: NORMAL
P-R INTERVAL, ECG05: 180 MS
Q-T INTERVAL, ECG07: 402 MS
QRS DURATION, ECG06: 74 MS
QTC CALCULATION (BEZET), ECG08: 442 MS
VENTRICULAR RATE, ECG03: 73 BPM

## 2020-02-24 ENCOUNTER — OFFICE VISIT (OUTPATIENT)
Dept: CARDIOLOGY CLINIC | Age: 45
End: 2020-02-24

## 2020-02-24 VITALS
SYSTOLIC BLOOD PRESSURE: 178 MMHG | DIASTOLIC BLOOD PRESSURE: 96 MMHG | WEIGHT: 293 LBS | BODY MASS INDEX: 55.75 KG/M2 | HEART RATE: 81 BPM | OXYGEN SATURATION: 97 %

## 2020-02-24 DIAGNOSIS — R06.00 DYSPNEA, UNSPECIFIED TYPE: ICD-10-CM

## 2020-02-24 DIAGNOSIS — R07.9 CHEST PAIN, UNSPECIFIED TYPE: Primary | ICD-10-CM

## 2020-02-24 RX ORDER — AMLODIPINE BESYLATE 10 MG/1
10 TABLET ORAL DAILY
Qty: 30 TAB | Refills: 5 | Status: SHIPPED | OUTPATIENT
Start: 2020-02-24 | End: 2020-09-24 | Stop reason: SDUPTHER

## 2020-02-24 RX ORDER — NITROGLYCERIN 0.4 MG/1
0.4 TABLET SUBLINGUAL
Qty: 25 TAB | Refills: 5 | Status: SHIPPED | OUTPATIENT
Start: 2020-02-24

## 2020-02-24 RX ORDER — ASPIRIN 81 MG/1
81 TABLET ORAL DAILY
Qty: 30 TAB | Refills: 5 | Status: SHIPPED | OUTPATIENT
Start: 2020-02-24 | End: 2020-10-21

## 2020-02-24 NOTE — PROGRESS NOTES
[facility]    Ms. Zena Stanley is a 81-year-old female with multiple medical problem    Patient is here today for initial evaluation. She recently went to emergency chest pain. She believes it for last 6 weeks she has been having some chest pain sometimes at rest sometimes with exertion. She described this pain is very sharp pain lasting anywhere from 10 to 15 minutes. Occasionally she feels nauseous along with this discomfort. This episodes are very trending sometimes at rest and sometimes with moving around and sometimes just upon standing up. She denies presyncope or syncope. She also has dyspnea with exertion. She denies any significant lower extremity edema at this time however she has history of edema in the past.  Denies any nausea, vomiting, abdominal pain, fever, chills, sputum production. No hematuria or other bleeding complaints    Past Medical History:   Diagnosis Date    ADHD     Bipolar disorder (HCC)     Carpal tunnel syndrome     DJD (degenerative joint disease)     Fibromyalgia     Hypertension     IBS (irritable bowel syndrome)     Irregular heart beat     Rheumatoid arthritis ([address]     Tobacco abuse          Past Surgical History:   Procedure Laterality Date    HX HYSTERECTOMY         Current Outpatient Medications   Medication Sig    nitroglycerin (NITROSTAT) 0.4 mg SL tablet 1 Tab by SubLINGual route every five (5) minutes as needed for Chest Pain. Up to 3 doses.  aspirin delayed-release 81 mg tablet Take 1 Tab by mouth daily.  amLODIPine (NORVASC) 10 mg tablet Take 1 Tab by mouth daily.  tiZANidine (ZANAFLEX) 4 mg capsule tizanidine 4 mg capsule    omeprazole (PRILOSEC) 40 mg capsule Take 1 Cap by mouth daily.  losartan/hydrochlorothiazide (HYZAAR PO) Take  by mouth.  GABAPENTIN PO Take  by mouth.  quetiapine fumarate (SEROQUEL PO) Take  by mouth.     linaclotide (LINZESS PO) Take  by mouth.    celecoxib (CELEBREX PO) Take  by mouth. No current facility-administered medications for this visit. Allergies and Sensitivities:  Allergies   Allergen Reactions    Aspirin Hives    Pcn [Penicillins] Hives       Family History:  No family history on file. Social History:  Social History     Tobacco Use    Smoking status: Current Every Day Smoker     Packs/day: 0.25    Smokeless tobacco: Never Used   Substance Use Topics    Alcohol use: Yes     Comment: rarely    Drug use: No     She  reports that she has been smoking. She has been smoking about 0.25 packs per day. She has never used smokeless tobacco.  She  reports current alcohol use. Review of Systems:  Cardiac symptoms as noted above in HPI. All others negative. Denies fatigue, malaise, skin rash, joint pain, blurring vision, photophobia, neck pain, hemoptysis, chronic cough, nausea, vomiting, hematuria, burning micturition, BRBPR, chronic headaches. Physical Exam:  BP Readings from Last 3 Encounters:   02/24/20 (!) 178/96   02/02/20 (!) 156/99   11/24/18 (!) 171/93         Pulse Readings from Last 3 Encounters:   02/24/20 81   02/02/20 70   11/24/18 72          Wt Readings from Last 3 Encounters:   02/24/20 335 lb (152 kg)   02/02/20 330 lb (149.7 kg)   11/24/18 237 lb (107.5 kg)       Constitutional: Oriented to person, place, and time. HENT: Head: Normocephalic and atraumatic. Eyes: Conjunctivae and extraocular motions are normal.   Neck: No JVD present. Carotid bruit is not appreciated. Cardiovascular: Regular rhythm. No murmur, gallop or rubs appreciated  Lung: Breath sounds normal. No respiratory distress. No ronchi or rales appreciated  Abdominal: No tenderness. No rebound and no guarding. Musculoskeletal: There is no lower extremity edema. No cynosis  Lymphadenopathy:  No cervical or supraclavicular adenopathy appriciated. Neurological: No gross motor deficit noted. Skin: No visible skin rash noted.    No Ear discharge noted  Psychiatric: Normal mood and affect. Good distal pulse    Review of Data  LABS:   Lab Results   Component Value Date/Time    Sodium 139 02/02/2020 06:00 PM    Potassium 3.1 (L) 02/02/2020 06:00 PM    Chloride 107 02/02/2020 06:00 PM    CO2 29 02/02/2020 06:00 PM    Glucose 98 02/02/2020 06:00 PM    BUN 8 02/02/2020 06:00 PM    Creatinine 1.20 02/02/2020 06:00 PM     No flowsheet data found. Lab Results   Component Value Date/Time    ALT (SGPT) 28 02/02/2020 06:00 PM     No results found for: HBA1C, HGBE8, FKQ7TYDH, IPL7TYTU    EKG  Sinus rhythm at 73 bpm.  Nonspecific ST-T abnormality in inferolateral lead    ECHO    STRESS TEST (EST, PHARM, NUC, ECHO etc)    CATHETERIZATION    IMPRESSION & PLAN:  Ms. Hazel Castillo is 71-year-old female with multiple medical problem    Chest pain and dyspnea:  Angina unless proven otherwise in a patient with multiple risk factors  Risk factors (hypertension, obesity, ongoing tobacco abuse disorder, family history of CAD in father at age 54)  We will proceed with nuclear stress test, high probability of false abnormal however patient would like noninvasive evaluation first  Echocardiogram  Aspirin 81 mg daily  Sublingual nitroglycerin as needed  Increase amlodipine to 10 mg daily  Advised against exertional activity    Hypertension:  Currently on Hyzaar and amlodipine. Will increase amlodipine to 10 mg daily. Echo to rule out pulmonary hypertension and hypertensive cardiovascular heart disease    Obesity:  Max weight 475 pounds according to patient. She was able to lose weight and was 235 pound. Now she is again 355 pounds. Importance of diet discussed. Exercise program after cardiac work-up    Tobacco abuse disorder:  She smoked anywhere from half a pack to 1 pack of cigarettes a day. Trying to quit. This plan was discussed with patient who is in agreement. Thank you for allowing me to participate in patient care.  Please feel free to call me if you have any question or concern. Petty Steen MD  Please note: This document has been produced using voice recognition software. Unrecognized errors in transcription may be present.

## 2020-02-24 NOTE — PATIENT INSTRUCTIONS
Echo and Nuclear Stress-  Please call central scheduling at [phone]      All testing/lab work is completed at [facility], [facility]     Start ASA 81 mg daily   Nitro 0.4 mg as needed   Increase Norvasc to 10 mg daily

## 2020-02-24 NOTE — PROGRESS NOTES
1. Have you been to the ER, urgent care clinic since your last visit? Hospitalized since your last visit? yes    2. Have you seen or consulted any other health care providers outside of the [address] [place] [address] since your last visit? Include any pap smears or colon screening.   No

## 2020-03-03 ENCOUNTER — HOSPITAL ENCOUNTER (OUTPATIENT)
Dept: NON INVASIVE DIAGNOSTICS | Age: 45
Discharge: HOME | End: 2020-03-03
Attending: INTERNAL MEDICINE
Payer: MEDICARE

## 2020-03-03 ENCOUNTER — HOSPITAL ENCOUNTER (OUTPATIENT)
Dept: NUCLEAR MEDICINE | Age: 45
Discharge: HOME | End: 2020-03-03
Attending: INTERNAL MEDICINE
Payer: MEDICARE

## 2020-03-03 VITALS
HEIGHT: 64 IN | WEIGHT: 293 LBS | BODY MASS INDEX: 50.02 KG/M2 | SYSTOLIC BLOOD PRESSURE: 161 MMHG | DIASTOLIC BLOOD PRESSURE: 94 MMHG

## 2020-03-03 VITALS
DIASTOLIC BLOOD PRESSURE: 96 MMHG | HEIGHT: 65 IN | SYSTOLIC BLOOD PRESSURE: 178 MMHG | BODY MASS INDEX: 48.82 KG/M2 | WEIGHT: 293 LBS

## 2020-03-03 DIAGNOSIS — R07.9 CHEST PAIN, UNSPECIFIED TYPE: ICD-10-CM

## 2020-03-03 DIAGNOSIS — R06.00 DYSPNEA, UNSPECIFIED TYPE: ICD-10-CM

## 2020-03-03 LAB
AV PEAK GRADIENT: 108.29 MMHG
ECHO AO ARCH DIAM: 3.51 CM
ECHO AO ASC DIAM: 3.52 CM
ECHO AO ROOT DIAM: 3.25 CM
ECHO AV REGURGITANT PHT: 557.3 CM
ECHO IVC SNIFF: 1.18 CM
ECHO LA MAJOR AXIS: 3.43 CM
ECHO LA TO AORTIC ROOT RATIO: 1.05
ECHO LV EDV A2C: 136.9 ML
ECHO LV EDV A4C: 162.8 ML
ECHO LV EDV BP: 149 ML (ref 56–104)
ECHO LV EDV INDEX A4C: 66.1 ML/M2
ECHO LV EDV INDEX BP: 60.5 ML/M2
ECHO LV EDV NDEX A2C: 55.6 ML/M2
ECHO LV EDV TEICHHOLZ: 0.56 ML
ECHO LV EJECTION FRACTION A2C: 50 %
ECHO LV EJECTION FRACTION A4C: 62 %
ECHO LV EJECTION FRACTION BIPLANE: 56.2 % (ref 55–100)
ECHO LV ESV A2C: 68.4 ML
ECHO LV ESV A4C: 61.3 ML
ECHO LV ESV BP: 65.3 ML (ref 19–49)
ECHO LV ESV INDEX A2C: 27.8 ML/M2
ECHO LV ESV INDEX A4C: 24.9 ML/M2
ECHO LV ESV INDEX BP: 26.5 ML/M2
ECHO LV ESV TEICHHOLZ: 0.22 ML
ECHO LV INTERNAL DIMENSION DIASTOLIC: 4.52 CM (ref 3.9–5.3)
ECHO LV INTERNAL DIMENSION SYSTOLIC: 3.07 CM
ECHO LV IVSD: 1.23 CM (ref 0.6–0.9)
ECHO LV MASS 2D: 174.9 G (ref 67–162)
ECHO LV MASS INDEX 2D: 71 G/M2 (ref 43–95)
ECHO LV POSTERIOR WALL DIASTOLIC: 0.74 CM (ref 0.6–0.9)
ECHO LVOT DIAM: 2.2 CM
ECHO LVOT PEAK GRADIENT: 3.6 MMHG
ECHO LVOT PEAK VELOCITY: 94.74 CM/S
ECHO LVOT SV: 72.1 ML
ECHO LVOT VTI: 18.96 CM
ECHO MV A VELOCITY: 81.7 CM/S
ECHO MV E DECELERATION TIME (DT): 261.4 MS
ECHO MV E VELOCITY: 56.22 CM/S
ECHO MV E/A RATIO: 0.69
ECHO PULMONARY ARTERY SYSTOLIC PRESSURE (PASP): 32.6 MMHG
ECHO TV REGURGITANT MAX VELOCITY: 272.21 CM/S
ECHO TV REGURGITANT PEAK GRADIENT: 29.6 MMHG
LVFS 2D: 32.12 %
LVOT MG: 2.05 MMHG
LVOT MV: 0.67 CM/S
LVSV (MOD BI): 31.73 ML
LVSV (MOD SINGLE 4C): 38.45 ML
LVSV (MOD SINGLE): 25.96 ML
LVSV (TEICH): 21.34 ML
MV DEC SLOPE: 2.15
PISA AR MAX VEL: 520.32 CM/S
STRESS BASELINE HR: 75 BPM
STRESS ESTIMATED WORKLOAD: 1 METS
STRESS EXERCISE DUR MIN: NORMAL
STRESS PEAK DIAS BP: 90 MMHG
STRESS PEAK SYS BP: 176 MMHG
STRESS PERCENT HR ACHIEVED: 56 %
STRESS POST PEAK HR: 100 BPM
STRESS RATE PRESSURE PRODUCT: NORMAL BPM*MMHG
STRESS TARGET HR: 177 BPM

## 2020-03-03 PROCEDURE — 74011250636 HC RX REV CODE- 250/636: Performed by: INTERNAL MEDICINE

## 2020-03-03 PROCEDURE — A9500 TC99M SESTAMIBI: HCPCS

## 2020-03-03 PROCEDURE — C8929 TTE W OR WO FOL WCON,DOPPLER: HCPCS

## 2020-03-03 PROCEDURE — 74011000250 HC RX REV CODE- 250: Performed by: INTERNAL MEDICINE

## 2020-03-03 PROCEDURE — 93017 CV STRESS TEST TRACING ONLY: CPT

## 2020-03-03 RX ADMIN — REGADENOSON 0.4 MG: 0.08 INJECTION, SOLUTION INTRAVENOUS at 10:50

## 2020-03-03 RX ADMIN — PERFLUTREN 1 ML: 6.52 INJECTION, SUSPENSION INTRAVENOUS at 10:10

## 2020-03-04 RX ORDER — METOPROLOL SUCCINATE 25 MG/1
25 TABLET, EXTENDED RELEASE ORAL DAILY
Qty: 30 TAB | Refills: 6 | Status: SHIPPED | OUTPATIENT
Start: 2020-03-04 | End: 2020-03-11

## 2020-03-05 ENCOUNTER — TELEPHONE (OUTPATIENT)
Dept: CARDIOLOGY CLINIC | Age: 45
End: 2020-03-05

## 2020-03-05 NOTE — TELEPHONE ENCOUNTER
----- Message from Tiny Nicolas MD sent at 3/3/2020  3:19 PM EST -----  Abnormal stress test  Needs cath discussion follow up    Thanks  SP

## 2020-03-09 ENCOUNTER — TELEPHONE (OUTPATIENT)
Dept: CARDIOLOGY CLINIC | Age: 45
End: 2020-03-09

## 2020-03-09 ENCOUNTER — APPOINTMENT (OUTPATIENT)
Dept: GENERAL RADIOLOGY | Age: 45
End: 2020-03-09
Attending: EMERGENCY MEDICINE
Payer: MEDICARE

## 2020-03-09 ENCOUNTER — HOSPITAL ENCOUNTER (OUTPATIENT)
Age: 45
Setting detail: OBSERVATION
Discharge: HOME | End: 2020-03-11
Attending: EMERGENCY MEDICINE | Admitting: INTERNAL MEDICINE
Payer: MEDICARE

## 2020-03-09 DIAGNOSIS — R07.9 CHEST PAIN: ICD-10-CM

## 2020-03-09 DIAGNOSIS — I20.0 UNSTABLE ANGINA (HCC): Primary | ICD-10-CM

## 2020-03-09 DIAGNOSIS — R93.1 ABNORMAL NUCLEAR CARDIAC IMAGING TEST: ICD-10-CM

## 2020-03-09 PROBLEM — G47.33 OSA (OBSTRUCTIVE SLEEP APNEA): Status: ACTIVE | Noted: 2020-03-09

## 2020-03-09 PROBLEM — I10 HTN (HYPERTENSION): Status: ACTIVE | Noted: 2020-03-09

## 2020-03-09 PROBLEM — K58.9 IBS (IRRITABLE BOWEL SYNDROME): Status: ACTIVE | Noted: 2020-03-09

## 2020-03-09 PROBLEM — Z72.0 TOBACCO ABUSE: Status: ACTIVE | Noted: 2020-03-09

## 2020-03-09 PROBLEM — E66.9 OBESITY: Status: ACTIVE | Noted: 2020-03-09

## 2020-03-09 PROBLEM — M06.9 RHEUMATOID ARTHRITIS (HCC): Status: ACTIVE | Noted: 2020-03-09

## 2020-03-09 LAB
ALBUMIN SERPL-MCNC: 3.7 G/DL (ref 3.4–5)
ALBUMIN/GLOB SERPL: 0.9 {RATIO} (ref 0.8–1.7)
ALP SERPL-CCNC: 98 U/L (ref 45–117)
ALT SERPL-CCNC: 27 U/L (ref 13–56)
ANION GAP SERPL CALC-SCNC: 2 MMOL/L (ref 3–18)
APTT PPP: 33.1 SEC (ref 23–36.4)
APTT PPP: 35 SEC (ref 23–36.4)
APTT PPP: 35.8 SEC (ref 23–36.4)
AST SERPL-CCNC: 16 U/L (ref 10–38)
BASOPHILS # BLD: 0 K/UL (ref 0–0.1)
BASOPHILS NFR BLD: 0 % (ref 0–3)
BILIRUB SERPL-MCNC: 0.2 MG/DL (ref 0.2–1)
BNP SERPL-MCNC: 106 PG/ML (ref 0–450)
BUN SERPL-MCNC: 11 MG/DL (ref 7–18)
BUN/CREAT SERPL: 9 (ref 12–20)
CALCIUM SERPL-MCNC: 9.4 MG/DL (ref 8.5–10.1)
CHLORIDE SERPL-SCNC: 103 MMOL/L (ref 100–111)
CO2 SERPL-SCNC: 34 MMOL/L (ref 21–32)
CREAT SERPL-MCNC: 1.25 MG/DL (ref 0.6–1.3)
DIFFERENTIAL METHOD BLD: ABNORMAL
EOSINOPHIL # BLD: 0.6 K/UL (ref 0–0.4)
EOSINOPHIL NFR BLD: 4 % (ref 0–5)
ERYTHROCYTE [DISTWIDTH] IN BLOOD BY AUTOMATED COUNT: 14 % (ref 11.6–14.5)
FLUAV AG NPH QL IA: NEGATIVE
FLUBV AG NOSE QL IA: NEGATIVE
GLOBULIN SER CALC-MCNC: 4.3 G/DL (ref 2–4)
GLUCOSE BLD STRIP.AUTO-MCNC: 93 MG/DL (ref 70–110)
GLUCOSE SERPL-MCNC: 103 MG/DL (ref 74–99)
HCT VFR BLD AUTO: 43.1 % (ref 35–45)
HGB BLD-MCNC: 14.1 G/DL (ref 12–16)
LYMPHOCYTES # BLD: 5.8 K/UL (ref 0.8–3.5)
LYMPHOCYTES NFR BLD: 41 % (ref 20–51)
MCH RBC QN AUTO: 31 PG (ref 24–34)
MCHC RBC AUTO-ENTMCNC: 32.7 G/DL (ref 31–37)
MCV RBC AUTO: 94.7 FL (ref 74–97)
MONOCYTES # BLD: 0.4 K/UL (ref 0–1)
MONOCYTES NFR BLD: 3 % (ref 2–9)
NEUTS SEG # BLD: 7.4 K/UL (ref 1.8–8)
NEUTS SEG NFR BLD: 52 % (ref 42–75)
NRBC BLD-RTO: 1 PER 100 WBC
PLATELET # BLD AUTO: 342 K/UL (ref 135–420)
PMV BLD AUTO: 11.4 FL (ref 9.2–11.8)
POTASSIUM SERPL-SCNC: 3.7 MMOL/L (ref 3.5–5.5)
PROT SERPL-MCNC: 8 G/DL (ref 6.4–8.2)
RBC # BLD AUTO: 4.55 M/UL (ref 4.2–5.3)
RBC MORPH BLD: ABNORMAL
SODIUM SERPL-SCNC: 139 MMOL/L (ref 136–145)
TROPONIN I SERPL-MCNC: <0.02 NG/ML (ref 0–0.04)
WBC # BLD AUTO: 14.2 K/UL (ref 4.6–13.2)

## 2020-03-09 PROCEDURE — 85025 COMPLETE CBC W/AUTO DIFF WBC: CPT

## 2020-03-09 PROCEDURE — 96366 THER/PROPH/DIAG IV INF ADDON: CPT

## 2020-03-09 PROCEDURE — 71045 X-RAY EXAM CHEST 1 VIEW: CPT

## 2020-03-09 PROCEDURE — 74011250637 HC RX REV CODE- 250/637: Performed by: INTERNAL MEDICINE

## 2020-03-09 PROCEDURE — 80053 COMPREHEN METABOLIC PANEL: CPT

## 2020-03-09 PROCEDURE — 65660000000 HC RM CCU STEPDOWN

## 2020-03-09 PROCEDURE — 85730 THROMBOPLASTIN TIME PARTIAL: CPT

## 2020-03-09 PROCEDURE — 82962 GLUCOSE BLOOD TEST: CPT

## 2020-03-09 PROCEDURE — 36415 COLL VENOUS BLD VENIPUNCTURE: CPT

## 2020-03-09 PROCEDURE — 96365 THER/PROPH/DIAG IV INF INIT: CPT

## 2020-03-09 PROCEDURE — 74011250636 HC RX REV CODE- 250/636: Performed by: EMERGENCY MEDICINE

## 2020-03-09 PROCEDURE — 99285 EMERGENCY DEPT VISIT HI MDM: CPT

## 2020-03-09 PROCEDURE — 71046 X-RAY EXAM CHEST 2 VIEWS: CPT

## 2020-03-09 PROCEDURE — 93005 ELECTROCARDIOGRAM TRACING: CPT

## 2020-03-09 PROCEDURE — 96374 THER/PROPH/DIAG INJ IV PUSH: CPT

## 2020-03-09 PROCEDURE — 74011250637 HC RX REV CODE- 250/637: Performed by: PHYSICIAN ASSISTANT

## 2020-03-09 PROCEDURE — 74011000250 HC RX REV CODE- 250: Performed by: EMERGENCY MEDICINE

## 2020-03-09 PROCEDURE — 84484 ASSAY OF TROPONIN QUANT: CPT

## 2020-03-09 PROCEDURE — 99218 HC RM OBSERVATION: CPT

## 2020-03-09 PROCEDURE — 74011250637 HC RX REV CODE- 250/637: Performed by: EMERGENCY MEDICINE

## 2020-03-09 PROCEDURE — 74011250636 HC RX REV CODE- 250/636: Performed by: INTERNAL MEDICINE

## 2020-03-09 PROCEDURE — 96375 TX/PRO/DX INJ NEW DRUG ADDON: CPT

## 2020-03-09 PROCEDURE — 83880 ASSAY OF NATRIURETIC PEPTIDE: CPT

## 2020-03-09 PROCEDURE — 87804 INFLUENZA ASSAY W/OPTIC: CPT

## 2020-03-09 PROCEDURE — 96376 TX/PRO/DX INJ SAME DRUG ADON: CPT

## 2020-03-09 RX ORDER — HEPARIN SODIUM 1000 [USP'U]/ML
3000 INJECTION, SOLUTION INTRAVENOUS; SUBCUTANEOUS ONCE
Status: COMPLETED | OUTPATIENT
Start: 2020-03-09 | End: 2020-03-09

## 2020-03-09 RX ORDER — MORPHINE SULFATE 10 MG/ML
5 INJECTION, SOLUTION INTRAMUSCULAR; INTRAVENOUS
Status: DISCONTINUED | OUTPATIENT
Start: 2020-03-09 | End: 2020-03-11 | Stop reason: HOSPADM

## 2020-03-09 RX ORDER — NALOXONE HYDROCHLORIDE 0.4 MG/ML
0.4 INJECTION, SOLUTION INTRAMUSCULAR; INTRAVENOUS; SUBCUTANEOUS AS NEEDED
Status: DISCONTINUED | OUTPATIENT
Start: 2020-03-09 | End: 2020-03-11 | Stop reason: HOSPADM

## 2020-03-09 RX ORDER — PANTOPRAZOLE SODIUM 40 MG/1
40 TABLET, DELAYED RELEASE ORAL
Status: DISCONTINUED | OUTPATIENT
Start: 2020-03-09 | End: 2020-03-11 | Stop reason: HOSPADM

## 2020-03-09 RX ORDER — CHLORZOXAZONE 500 MG/1
500 TABLET ORAL
COMMUNITY

## 2020-03-09 RX ORDER — METOPROLOL SUCCINATE 25 MG/1
25 TABLET, EXTENDED RELEASE ORAL DAILY
Status: DISCONTINUED | OUTPATIENT
Start: 2020-03-10 | End: 2020-03-09

## 2020-03-09 RX ORDER — OLANZAPINE 15 MG/1
15 TABLET ORAL
COMMUNITY

## 2020-03-09 RX ORDER — ACETAMINOPHEN 500 MG
1000 TABLET ORAL ONCE
Status: COMPLETED | OUTPATIENT
Start: 2020-03-09 | End: 2020-03-09

## 2020-03-09 RX ORDER — NITROGLYCERIN 40 MG/100ML
5 INJECTION INTRAVENOUS CONTINUOUS
Status: DISCONTINUED | OUTPATIENT
Start: 2020-03-09 | End: 2020-03-11 | Stop reason: HOSPADM

## 2020-03-09 RX ORDER — GUAIFENESIN 100 MG/5ML
81 LIQUID (ML) ORAL
Status: COMPLETED | OUTPATIENT
Start: 2020-03-09 | End: 2020-03-09

## 2020-03-09 RX ORDER — OXYBUTYNIN CHLORIDE 5 MG/1
5 TABLET ORAL 3 TIMES DAILY
COMMUNITY

## 2020-03-09 RX ORDER — GUAIFENESIN 100 MG/5ML
81 LIQUID (ML) ORAL DAILY
Status: DISCONTINUED | OUTPATIENT
Start: 2020-03-09 | End: 2020-03-09

## 2020-03-09 RX ORDER — ASPIRIN 81 MG/1
81 TABLET ORAL DAILY
Status: DISCONTINUED | OUTPATIENT
Start: 2020-03-10 | End: 2020-03-11 | Stop reason: HOSPADM

## 2020-03-09 RX ORDER — METOPROLOL SUCCINATE 50 MG/1
25 TABLET, EXTENDED RELEASE ORAL DAILY
Status: DISCONTINUED | OUTPATIENT
Start: 2020-03-09 | End: 2020-03-11

## 2020-03-09 RX ORDER — HEPARIN SODIUM 1000 [USP'U]/ML
4000 INJECTION, SOLUTION INTRAVENOUS; SUBCUTANEOUS ONCE
Status: COMPLETED | OUTPATIENT
Start: 2020-03-09 | End: 2020-03-09

## 2020-03-09 RX ORDER — HEPARIN SODIUM 10000 [USP'U]/100ML
6-25 INJECTION, SOLUTION INTRAVENOUS
Status: DISCONTINUED | OUTPATIENT
Start: 2020-03-09 | End: 2020-03-11 | Stop reason: HOSPADM

## 2020-03-09 RX ORDER — ATORVASTATIN CALCIUM 20 MG/1
40 TABLET, FILM COATED ORAL
Status: DISCONTINUED | OUTPATIENT
Start: 2020-03-09 | End: 2020-03-11 | Stop reason: HOSPADM

## 2020-03-09 RX ORDER — AMLODIPINE BESYLATE 10 MG/1
10 TABLET ORAL DAILY
Status: DISCONTINUED | OUTPATIENT
Start: 2020-03-10 | End: 2020-03-11 | Stop reason: HOSPADM

## 2020-03-09 RX ORDER — NITROGLYCERIN 0.4 MG/1
0.4 TABLET SUBLINGUAL
Status: DISCONTINUED | OUTPATIENT
Start: 2020-03-09 | End: 2020-03-11 | Stop reason: HOSPADM

## 2020-03-09 RX ADMIN — HEPARIN SODIUM 4000 UNITS: 1000 INJECTION INTRAVENOUS; SUBCUTANEOUS at 12:19

## 2020-03-09 RX ADMIN — NITROGLYCERIN 5 MCG/MIN: 40 INJECTION INTRAVENOUS at 12:24

## 2020-03-09 RX ADMIN — HEPARIN SODIUM AND DEXTROSE 8 UNITS/KG/HR: 10000; 5 INJECTION INTRAVENOUS at 18:20

## 2020-03-09 RX ADMIN — ASPIRIN 81 MG CHEWABLE TABLET 81 MG: 81 TABLET CHEWABLE at 17:00

## 2020-03-09 RX ADMIN — HEPARIN SODIUM AND DEXTROSE 6 UNITS/KG/HR: 10000; 5 INJECTION INTRAVENOUS at 12:20

## 2020-03-09 RX ADMIN — MORPHINE SULFATE 5 MG: 10 INJECTION, SOLUTION INTRAMUSCULAR; INTRAVENOUS at 16:39

## 2020-03-09 RX ADMIN — PANTOPRAZOLE SODIUM 40 MG: 40 TABLET, DELAYED RELEASE ORAL at 17:00

## 2020-03-09 RX ADMIN — METOPROLOL SUCCINATE 25 MG: 50 TABLET, FILM COATED, EXTENDED RELEASE ORAL at 13:50

## 2020-03-09 RX ADMIN — ACETAMINOPHEN 1000 MG: 500 TABLET, FILM COATED ORAL at 10:54

## 2020-03-09 RX ADMIN — HEPARIN SODIUM 3000 UNITS: 1000 INJECTION INTRAVENOUS; SUBCUTANEOUS at 18:20

## 2020-03-09 RX ADMIN — MORPHINE SULFATE 5 MG: 10 INJECTION, SOLUTION INTRAMUSCULAR; INTRAVENOUS at 23:03

## 2020-03-09 RX ADMIN — ATORVASTATIN CALCIUM 40 MG: 20 TABLET, FILM COATED ORAL at 21:33

## 2020-03-09 NOTE — Clinical Note
2cc air removed from TR band
2cc air removed from TR band
Catheter inserted.
Catheter removed.
Catheter removed.
Contrast: Isovue. Contrast concentration: 300. Amount: 126 mL.
Diagnostic wire inserted.
Diagnostic wire removed. Guidewire tip is intact.
History and physical documented and up to date, allergies reviewed, lab results reviewed, pre-procedure education provided, patient verbalized understanding of procedure, procedural consent signed and patient is NPO.
ID band present and verified. Family is in the waiting area.
Lesion: Located in the Proximal LAD. Pressure wire inserted.
Lesion: Located in the Proximal LAD. Pressure wire measurements taken.    iFR value =  0.91.
Lesion: Located in the Proximal LAD. Pressure wire removed.
Multiple views of the left coronary artery obtained using hand injection.
Multiple views of the right coronary artery obtained using hand injection.
No specimen collected. Estimated Blood Loss: <30 mL.
PT reassessed immediately prior to sedation administration.
Radial sheath flushed
Right femoral artery. Accessed successfully.
Right groin and right radial clipped, prepped with ChloraPrep and draped. Wet prep solution applied at: 1023. Wet prep solution dried at: 1026. Wet prep elapsed drying time: 3 mins.
Right radial artery. Accessed successfully.
Safety belt put on PT. PT placed on 2 liters of O2. PT in supine position.
Sheath #1: Closed using Angio-Seal and manual compression. Pressure held for: 5 minutes.
Sheath #1: Closed using R-Band. Radial band pressure set at: 10.
Sheath #1: Presents as clean, dry, & intact, no bleeding and no hematoma.
Sheath #1: Sheath: inserted. Sheath inserted/placed in the right femoral  artery. Hemostasis achieved.
Sheath #2: Closed using Angio-Seal and manual compression. Pressure held for: 5 minutes.
Sheath #2: Dressed using 4 X 4 and transparent dressing.
Sheath #2: Presents as clean, dry, & intact, no bleeding and no hematoma.
Switching to groin access for iFR.
TRANSFER - OUT REPORT:     Verbal report given to: Ken Stanford RN. Report consisted of patient's Situation, Background, Assessment and   Recommendations(SBAR). Opportunity for questions and clarification was provided. Patient transported with a Cardiac Cath Tech / Patient Care Tech. Patient transported to: PACU.
The physician has been notified.
Spine appears normal, range of motion is not limited, no muscle or joint tenderness

## 2020-03-09 NOTE — CONSULTS
[facility] - Consult Note    Date of  Admission: 3/9/2020 10:29 AM   Primary Care Physician:  Jose Armando Arciniega MD      I saw, evaluated, interviewed and examined the patient personally. I agree with the findings and plan of care as documented below with PA-C note  Recurrent chest pain with mixed features  Recent abnormal stress test  EKG with unchanged Inferolateral ST/T changes from before  First enzyme negative  Trend enzymes  Start IV heparin per ACS protocol  IV NTG  ASA, statin  DW patient regarding management strategy which includes medical management vs. Ischemia evaluation ( non-invasive vs. Invasive). Risk, benefit and alternatives of each strategy discussed in detail. Risk, benefit, complication of LHC and possible PCI ( including but not limited to bleeding, vascular trauma requiring surgery,  infection, heart failure, stroke, MI, emergent bypass surgery, severe allergic reactions, kidney failure, dialysis and death ) were discussed with patient. Further plan per hospital course. Viviane Longo MD        Assessment:     - Chest pain, initial Troponin negative and EKG with inferolateral ST-T wave changes consistent with tracing in Feb 2020  - Abnormal nuclear stress test 03/03/20: There is a defect that is small to moderate in size present in the apical inferior and apex location(s) that is partially reversible. The defect appears to be infarction with ian-infarct ischemia.  - Echo 03/03/20 with EF 35-50%, mild aortic regurgitation, no evidence of pulmonary hypertension  - Hypertension  - Dyslipidemia  - Obesity  - Tobacco use      Plan:     - Agree with Heparin and NTG infusion which has been started by the ED physician   - Trend cardiac enzymes  - Will start beta blocker, ASA and statin  - With recent abnormal nuclear stress test and chest pain, will plan for cardiac catheterization this admission  - Admission to telemetry     History of Present Illness:      This is a 39 y.o. female admitted for No admission diagnoses are documented for this encounter. .    Patient complains of: Chest pain     This is a 39year old female with the above outlined past medical history that cardiology is seeing in consult due to chest pain and abnormal nuclear stress test on March 3rd. Patient explains this is the typical chest pain she has been having for the last several weeks described as pressure and radiating down her left arm. Associated with shortness of breath but no nausea or diaphoresis. It started around 11 PM last night and she took some NTG with mild relief. Patient has no known history of cardiac stenting or CHF. She does smoke cigarettes and is trying to quit. Cardiac risk factors: dyslipidemia, diabetes mellitus, obesity, sedentary life style, hypertension      Review of Symptoms:  Except as stated above include:  Constitutional:  negative  Respiratory:  negative  Cardiovascular:  Per HPI  Gastrointestinal: negative  Genitourinary:  negative  Musculoskeletal:  Negative  Neurological:  Negative  Dermatological:  Negative  Endocrinological: Negative  Psychological:  Negative    A comprehensive review of systems was negative except for that written in the HPI.      Past Medical History:     Past Medical History:   Diagnosis Date    ADHD     Bipolar disorder ([facility] [address]     Carpal tunnel syndrome     DJD (degenerative joint disease)     Fibromyalgia     Hypertension     IBS (irritable bowel syndrome)     Irregular heart beat     Rheumatoid arthritis (HCC)     Tobacco abuse          Social History:     Social History     Socioeconomic History    Marital status: SINGLE     Spouse name: Not on file    Number of children: Not on file    Years of education: Not on file    Highest education level: Not on file   Tobacco Use    Smoking status: Current Every Day Smoker     Packs/day: 0.25    Smokeless tobacco: Never Used   Substance and Sexual Activity    Alcohol use: Yes     Comment: rarely    Drug use: No        Family History:   No family history on file. Medications: Allergies   Allergen Reactions    Aspirin Hives    Pcn [Penicillins] Hives        Current Facility-Administered Medications   Medication Dose Route Frequency    nitroglycerin (TRIDIL) 400 mcg/ml infusion  5 mcg/min IntraVENous CONTINUOUS    heparin 25,000 units in D5W 250 ml infusion  6-25 Units/kg/hr IntraVENous TITRATE     Current Outpatient Medications   Medication Sig    metoprolol succinate (TOPROL-XL) 25 mg XL tablet Take 1 Tab by mouth daily.  nitroglycerin (NITROSTAT) 0.4 mg SL tablet 1 Tab by SubLINGual route every five (5) minutes as needed for Chest Pain. Up to 3 doses.  aspirin delayed-release 81 mg tablet Take 1 Tab by mouth daily.  amLODIPine (NORVASC) 10 mg tablet Take 1 Tab by mouth daily.  losartan/hydrochlorothiazide (HYZAAR PO) Take  by mouth.  GABAPENTIN PO Take  by mouth.  quetiapine fumarate (SEROQUEL PO) Take  by mouth.  linaclotide (LINZESS PO) Take  by mouth.  celecoxib (CELEBREX PO) Take  by mouth.  tiZANidine (ZANAFLEX) 4 mg capsule tizanidine 4 mg capsule    omeprazole (PRILOSEC) 40 mg capsule Take 1 Cap by mouth daily.          Physical Exam:     Visit Vitals  BP (!) 127/93   Pulse 78   Temp 98 °F (36.7 °C)   Resp 16   Ht 5' 4\" (1.626 m)   Wt 335 lb (152 kg)   SpO2 97%   BMI 57.50 kg/m²     BP Readings from Last 3 Encounters:   03/09/20 (!) 127/93   03/03/20 (!) 161/94   03/03/20 (!) 178/96     Pulse Readings from Last 3 Encounters:   03/09/20 78   02/24/20 81   02/02/20 70     Wt Readings from Last 3 Encounters:   03/09/20 335 lb (152 kg)   03/03/20 345 lb (156.5 kg)   03/03/20 335 lb (152 kg)       General:  alert, cooperative, no distress  Neck:  nontender, no JVD  Lungs:  clear to auscultation bilaterally  Heart:  regular rate and rhythm, S1, S2 normal, no murmur, click, rub or gallop  Abdomen:  abdomen is soft without significant tenderness, masses, organomegaly or guarding  Extremities:  extremities normal, atraumatic, no cyanosis or edema  Skin: Warm and dry. no hyperpigmentation, vitiligo, or suspicious lesions  Neuro: alert, oriented x3, affect appropriate  Psych: non focal     Data Review:     Recent Labs     03/09/20  1046   WBC 14.2*   HGB 14.1   HCT 43.1        Recent Labs     03/09/20  1046      K 3.7      CO2 34*   *   BUN 11   CREA 1.25   CA 9.4   ALB 3.7   SGOT 16   ALT 27       Results for orders placed or performed during the hospital encounter of 03/09/20   EKG, 12 LEAD, INITIAL   Result Value Ref Range    Ventricular Rate 74 BPM    Atrial Rate 74 BPM    P-R Interval 172 ms    QRS Duration 76 ms    Q-T Interval 376 ms    QTC Calculation (Bezet) 417 ms    Calculated P Axis 54 degrees    Calculated R Axis 40 degrees    Calculated T Axis -179 degrees    Diagnosis       Normal sinus rhythm  Nonspecific T wave abnormality  Abnormal ECG  When compared with ECG of 03-MAR-2020 10:29,  QT has shortened         All Cardiac Markers in the last 24 hours:    Lab Results   Component Value Date/Time    TROIQ <0.02 03/09/2020 10:46 AM       Last Lipid:    Lab Results   Component Value Date/Time    Cholesterol, total 176 01/04/2010 12:08 PM    HDL Cholesterol 45 01/04/2010 12:08 PM    LDL, calculated 109.4 (H) 01/04/2010 12:08 PM    Triglyceride 108 01/04/2010 12:08 PM    CHOL/HDL Ratio 3.9 01/04/2010 12:08 PM       Signed By: Aiden Morel.  Doreen Carlson     March 9, 2020

## 2020-03-09 NOTE — PROGRESS NOTES
Problem: Discharge Planning  Goal: *Discharge to safe environment  Outcome: Progressing Towards Goal    Home    Care Management Interventions  PCP Verified by CM: Yes(Stephen Guerra)  Transition of Care Consult (CM Consult): Discharge Planning  Current Support Network: (lives with her kids and parents are support)  Confirm Follow Up Transport: Family  The Plan for Transition of Care is Related to the Following Treatment Goals : home  Discharge Location  Discharge Placement: Home     Reason for Admission:   Unstable angina                   RUR Score:   Patient in ED score unavailable at this time                  Plan for utilizing home health:    Not home bound      PCP: Zipporah Siemens   Are you a current patient: Yes    Approximate date of last visit: one month ago had appt. Today but came to ED                    Current Advanced Directive/Advance Care Plan:  Not on file not interested at this time. Transition of Care Plan:     She verified her demographics, insurance and PCP as correct on the facesheet. Independent with her care and ADL's wears a CPAP at home. Patient has designated _____parents Roxanna Teagueon [phone] and Dottie Carrillo [phone]___________________ to participate in his/her discharge plan and to receive any needed information. Plan is to return home when medically cleared family or friendto transport home.

## 2020-03-09 NOTE — ED PROVIDER NOTES
EMERGENCY DEPARTMENT HISTORY AND PHYSICAL EXAM    12:11 PM      Date: 3/9/2020  Patient Name: Dorothy Shanks    History of Presenting Illness     Chief Complaint   Patient presents with    Chest Pain    Shortness of Breath         History Provided By: Patient      Additional History (Context): Dorothy Shanks is a 39 y.o. female with PMHx hypertension, fibromyalgia, bipolar disorder, and tobacco abuse who presents with chest pain. Patient reports diffuse pain that she states feels light tightness and radiates into her left arm. Patient states her pain has been intermittently ongoing for a couple weeks and she was told to come in by Dr. Cecile Lai (Cardiology) after no relief with 5 nitroglycerin. Associated symptoms include shortness of breath, but she denies fever. Patient had an abnormal stress test on 3/3/20. She admits to tobacco use. No other concerns or symptoms at this time.      PCP: Tova Priest MD    Chief Complaint: Chest Pain  Duration:  2 weeks  Timing:  Intermittent  Location: diffuse chest  Quality: Tightness and radiating into left arm  Severity: N/A  Modifying Factors: 5 nitro with no relief  Associated Symptoms: shortness of breath    Current Facility-Administered Medications   Medication Dose Route Frequency Provider Last Rate Last Dose    nitroglycerin (TRIDIL) 400 mcg/ml infusion  5 mcg/min IntraVENous CONTINUOUS Jose Ramon Roberts MD 0.8 mL/hr at 03/09/20 1224 5 mcg/min at 03/09/20 1224    heparin 25,000 units in D5W 250 ml infusion  6-25 Units/kg/hr IntraVENous TITRATE Jose Ramon Roberts MD 12.2 mL/hr at 03/09/20 1820 8 Units/kg/hr at 03/09/20 1820    metoprolol succinate (TOPROL-XL) XL tablet 25 mg  25 mg Oral DAILY Jose Ramon Roberts MD   25 mg at 03/09/20 1350    atorvastatin (LIPITOR) tablet 40 mg  40 mg Oral QHS MD Lc Gaffney ON 3/10/2020] amLODIPine (NORVASC) tablet 10 mg  10 mg Oral DAILY Jose Ramon Roberts MD        [START ON 3/10/2020] aspirin delayed-release tablet 81 mg  81 mg Oral DAILY Venkatesh Grande MD        nitroglycerin (NITROSTAT) tablet 0.4 mg  0.4 mg SubLINGual Q5MIN PRN Venkatesh Grande MD        pantoprazole (PROTONIX) tablet 40 mg  40 mg Oral ACB&D Venkatesh Grande MD   40 mg at 03/09/20 1700    morphine 10 mg/ml injection 5 mg  5 mg IntraVENous Q3H PRN Venkatesh Grande MD   5 mg at 03/09/20 1639    naloxone (NARCAN) injection 0.4 mg  0.4 mg IntraVENous PRN Venkatesh Grande MD        Gabapentin - Clarify patient's home regimen and enter order into [facility]  1 Each Other Jovita Norman MD        Linzess - Clarify patient's home regimen and enter order into [facility]  1 Each Other DAILY MD Lisandro Villagomezce Roney ON 3/10/2020] Hyzaar - Clarify patient's home regimen and enter order into [facility]  1 Each Other DAILY Venkatesh Grande MD        Seroquel - Clarify patient's home regimen and enter order into [facility]  1 Each Other Jovita Norman MD           Past History     Past Medical History:  Past Medical History:   Diagnosis Date    ADHD     [facility]ipolar disorder ([facility] [address]     Carpal tunnel syndrome     DJD (degenerative joint disease)     Fibromyalgia     Hypertension     IBS (irritable bowel syndrome)     Irregular heart beat     Rheumatoid arthritis ([facility] [address]     Tobacco abuse        Past Surgical History:  Past Surgical History:   Procedure Laterality Date    HX HYSTERECTOMY         Family History:  No family history on file. Social History:  Social History     Tobacco Use    Smoking status: Current Every Day Smoker     Packs/day: 0.25    Smokeless tobacco: Never Used   Substance Use Topics    Alcohol use: Yes     Comment: rarely    Drug use: No       Allergies: Allergies   Allergen Reactions    Aspirin Hives    Pcn [Penicillins] Hives         Review of Systems       Review of Systems   Constitutional: Negative for activity change, fatigue and fever. HENT: Negative for congestion and rhinorrhea. Eyes: Negative for visual disturbance. Respiratory: Positive for shortness of breath. Cardiovascular: Positive for chest pain. Negative for palpitations. Gastrointestinal: Negative for abdominal pain, diarrhea, nausea and vomiting. Genitourinary: Negative for dysuria and hematuria. Musculoskeletal: Negative for back pain. Skin: Negative for rash. Neurological: Negative for dizziness, weakness and light-headedness. All other systems reviewed and are negative. Physical Exam     Visit Vitals  /87 (BP 1 Location: Left arm, BP Patient Position: At rest)   Pulse 87   Temp 98.9 °F (37.2 °C)   Resp 20   Ht 5' 4\" (1.626 m)   Wt 152 kg (335 lb)   SpO2 96%   Breastfeeding No   BMI 57.50 kg/m²         Physical Exam  Vitals signs and nursing note reviewed. Constitutional:       General: She is not in acute distress. Appearance: She is well-developed. HENT:      Head: Normocephalic and atraumatic. Right Ear: External ear normal.      Left Ear: External ear normal.      Nose: Nose normal.   Eyes:      General: No scleral icterus. Conjunctiva/sclera: Conjunctivae normal.      Pupils: Pupils are equal, round, and reactive to light. Neck:      Musculoskeletal: Normal range of motion and neck supple. Trachea: No tracheal deviation. Cardiovascular:      Rate and Rhythm: Normal rate and regular rhythm. Pulmonary:      Effort: Pulmonary effort is normal.      Breath sounds: Normal breath sounds. Chest:      Chest wall: No tenderness. Abdominal:      General: Bowel sounds are normal. There is no distension. Palpations: Abdomen is soft. Tenderness: There is no abdominal tenderness. There is no guarding or rebound. Musculoskeletal: Normal range of motion. General: No tenderness. Skin:     General: Skin is warm and dry. Neurological:      Mental Status: She is alert and oriented to person, place, and time. Cranial Nerves: No cranial nerve deficit. Coordination: Coordination normal.   Psychiatric:         Behavior: Behavior normal.         Thought Content: Thought content normal.         Judgment: Judgment normal.           Diagnostic Study Results     Labs -  Recent Results (from the past 12 hour(s))   EKG, 12 LEAD, INITIAL    Collection Time: 03/09/20 10:09 AM   Result Value Ref Range    Ventricular Rate 74 BPM    Atrial Rate 74 BPM    P-R Interval 172 ms    QRS Duration 76 ms    Q-T Interval 376 ms    QTC Calculation (Bezet) 417 ms    Calculated P Axis 54 degrees    Calculated R Axis 40 degrees    Calculated T Axis -179 degrees    Diagnosis       Normal sinus rhythm  Nonspecific T wave abnormality  Abnormal ECG  When compared with ECG of 03-MAR-2020 10:29,  QT has shortened     CBC WITH AUTOMATED DIFF    Collection Time: 03/09/20 10:46 AM   Result Value Ref Range    WBC 14.2 (H) 4.6 - 13.2 K/uL    RBC 4.55 4.20 - 5.30 M/uL    HGB 14.1 12.0 - 16.0 g/dL    HCT 43.1 35.0 - 45.0 %    MCV 94.7 74.0 - 97.0 FL    MCH 31.0 24.0 - 34.0 PG    MCHC 32.7 31.0 - 37.0 g/dL    RDW 14.0 11.6 - 14.5 %    PLATELET 668 861 - 913 K/uL    MPV 11.4 9.2 - 11.8 FL    NEUTROPHILS 52 42 - 75 %    LYMPHOCYTES 41 20 - 51 %    MONOCYTES 3 2 - 9 %    EOSINOPHILS 4 0 - 5 %    BASOPHILS 0 0 - 3 %    NRBC 1.0 (H) 0  WBC    ABS. NEUTROPHILS 7.4 1.8 - 8.0 K/UL    ABS. LYMPHOCYTES 5.8 (H) 0.8 - 3.5 K/UL    ABS. MONOCYTES 0.4 0 - 1.0 K/UL    ABS. EOSINOPHILS 0.6 (H) 0.0 - 0.4 K/UL    ABS.  BASOPHILS 0.0 0.0 - 0.1 K/UL    RBC COMMENTS NORMOCYTIC, NORMOCHROMIC      DF MANUAL     METABOLIC PANEL, COMPREHENSIVE    Collection Time: 03/09/20 10:46 AM   Result Value Ref Range    Sodium 139 136 - 145 mmol/L    Potassium 3.7 3.5 - 5.5 mmol/L    Chloride 103 100 - 111 mmol/L    CO2 34 (H) 21 - 32 mmol/L    Anion gap 2 (L) 3.0 - 18 mmol/L    Glucose 103 (H) 74 - 99 mg/dL    BUN 11 7.0 - 18 MG/DL    Creatinine 1.25 0.6 - 1.3 MG/DL    BUN/Creatinine ratio 9 (L) 12 - 20      GFR est AA 57 (L) >60 ml/min/1.73m2    GFR est non-AA 47 (L) >60 ml/min/1.73m2    Calcium 9.4 8.5 - 10.1 MG/DL    Bilirubin, total 0.2 0.2 - 1.0 MG/DL    ALT (SGPT) 27 13 - 56 U/L    AST (SGOT) 16 10 - 38 U/L    Alk. phosphatase 98 45 - 117 U/L    Protein, total 8.0 6.4 - 8.2 g/dL    Albumin 3.7 3.4 - 5.0 g/dL    Globulin 4.3 (H) 2.0 - 4.0 g/dL    A-G Ratio 0.9 0.8 - 1.7     TROPONIN I    Collection Time: 03/09/20 10:46 AM   Result Value Ref Range    Troponin-I, QT <0.02 0.0 - 0.045 NG/ML   PTT    Collection Time: 03/09/20 10:46 AM   Result Value Ref Range    aPTT 33.1 23.0 - 36.4 SEC   NT-PRO BNP    Collection Time: 03/09/20 10:46 AM   Result Value Ref Range    NT pro- 0 - 450 PG/ML   INFLUENZA A & B AG (RAPID TEST)    Collection Time: 03/09/20 11:28 AM   Result Value Ref Range    Influenza A Antigen NEGATIVE  NEG      Influenza B Antigen NEGATIVE  NEG     PTT    Collection Time: 03/09/20  4:45 PM   Result Value Ref Range    aPTT 35.8 23.0 - 36.4 SEC   TROPONIN I    Collection Time: 03/09/20  4:45 PM   Result Value Ref Range    Troponin-I, QT <0.02 0.0 - 0.045 NG/ML       Radiologic Studies -   XR CHEST PORT   Final Result   IMPRESSION:       1. No acute pulmonary disease. Medical Decision Making     It should be noted that Ron BANERJEE DO will be the provider of record for this patient. I reviewed the vital signs, available nursing notes, past medical history, past surgical history, family history and social history. Vital Signs-Reviewed the patient's vital signs. Pulse Oximetry Analysis -  97% on room air , nml    Cardiac Monitor:  Rate: 73 BPM    EKG: Interpreted by the EP.    Time Interpreted: 10:09 AM   Rate: 74 BPM   Rhythm: Normal Sinus Rhythm    Interpretation: T wave inversions in inferolateral leads with no ST elevation    Records Reviewed: Nursing Notes and Old Medical Records (Time of Review: 12:11 PM)    Orders Placed This Encounter    NO VTE PROPHYLAXIS NEEDED    INFLUENZA A & B AG (RAPID TEST)    XR CHEST PORT    CBC WITH AUTOMATED DIFF    COMPREHENSIVE METABOLIC PANEL    TROPONIN I    PTT    CBC W/ AUTOMATED DIFF--DAILY WHILE ON HEPARIN    PTT    PRO-BNP    LIPID PANEL    PROTHROMBIN TIME    CBC WITH AUTOMATED DIFF    METABOLIC PANEL, BASIC    PHOSPHORUS    MAGNESIUM    TROPONIN I    PTT    DIET CARDIAC Regular    NOTIFY PROVIDER: LAB VALUES CHANGES    NOTIFY PROVIDER: SPECIFY If any sign of bleeding and/or hematoma, STOP heparin. Notify physician STAT and do STAT CBC. Hold heparin until notified by physician. ONE TIME Routine    NOTIFY PROVIDER: LAB VALUES CHANGES    VITAL SIGNS    CARDIAC MONITORING    FULL CODE    OXIMETRY, SPOT CHECK    RT--RESPIRATORY THERAPY MISCELLANEOUS Use   CPAP as at home Routine QHS    EKG, 12 LEAD, INITIAL    EKG, 12 LEAD, INITIAL    acetaminophen (TYLENOL) tablet 1,000 mg    nitroglycerin (TRIDIL) 400 mcg/ml infusion    heparin (porcine) 1,000 unit/mL injection 4,000 Units    heparin 25,000 units in D5W 250 ml infusion    DISCONTD: aspirin chewable tablet 81 mg    metoprolol succinate (TOPROL-XL) XL tablet 25 mg    atorvastatin (LIPITOR) tablet 40 mg    amLODIPine (NORVASC) tablet 10 mg    aspirin delayed-release tablet 81 mg    DISCONTD: . PHARMACY TO SUBSTITUTE PER PROTOCOL (Reordered from: GABAPENTIN PO)    DISCONTD: . PHARMACY TO SUBSTITUTE PER PROTOCOL (Reordered from: linaclotide (LINZESS PO))    DISCONTD: . PHARMACY TO SUBSTITUTE PER PROTOCOL (Reordered from: losartan/hydrochlorothiazide (HYZAAR PO))    DISCONTD: metoprolol succinate (TOPROL-XL) XL tablet 25 mg    nitroglycerin (NITROSTAT) tablet 0.4 mg    pantoprazole (PROTONIX) tablet 40 mg    DISCONTD: . PHARMACY TO SUBSTITUTE PER PROTOCOL (Reordered from: quetiapine fumarate (SEROQUEL PO))    morphine 10 mg/ml injection 5 mg    OLANZapine (ZYPREXA) 15 mg tablet    chlorzoxazone (PARAFON FORTE) 500 mg tablet  oxybutynin (DITROPAN) 5 mg tablet    aspirin chewable tablet 81 mg    naloxone (NARCAN) injection 0.4 mg    Gabapentin - Clarify patient's home regimen and enter order into [facility]    Linzess - Clarify patient's home regimen and enter order into [facility]    Hyzaar - Clarify patient's home regimen and enter order into [facility]    Seroquel - Clarify patient's home regimen and enter order into [facility]    heparin (porcine) 1,000 unit/mL injection 3,000 Units    IP CONSULT TO HOSPITALIST    IP CONSULT TO CARDIOLOGY    IP CONSULT TO CARDIOLOGY    INITIAL PHYSICIAN ORDER: INPATIENT Telemetry; 9. Other (further clarification in H&P documentation)       ED Course: Progress Notes, Reevaluation, and Consults:  11:59 AM  Consult:  Discussed care with Dylon Reyes ([facility]) Standard discussion; including history of patients chief complaint, available diagnostic results, and treatment course. Will consult on patient. 12:12 PM  Consult:  D[facility] care [facility] Standard discussion; including history of patients chief complaint, available diagnostic results, and treatment course. Accepts patient admission. Provider Notes (Medical Decision Making):   Patient is a 44-year-old female who comes in complaining of chest pain. Patient has seen Dr. Kishan Izaguirre from cardiology and has had an abnormal stress test on 3 3 with concern for reversible ischemia. Given symptoms today that occur with exertion or at rest, have discussed with cardiology and feel be best for the patient to be admitted for possible PCI. Patient is agreeable to this plan. She has taken aspirin. Heparin initiated in the emergency department with concern for unstable angina. Patient currently with no pain and asking if she can eat. Discussed with cardiology and the hospitalist service. Stable at the time of admission.                For Hospitalized Patients:    Hospitalization Decision Time:  The decision to hospitalize the patient was made by Dr. Geri Wolfe at 12:00 PM on 3/9/2020      Diagnosis     Clinical Impression:   1. Unstable angina (HCC)        Disposition: Admitted    Follow-up Information    None          Current Discharge Medication List      CONTINUE these medications which have NOT CHANGED    Details   OLANZapine (ZYPREXA) 15 mg tablet Take 15 mg by mouth nightly. Indications: bipolar disorder in remission      chlorzoxazone (PARAFON FORTE) 500 mg tablet Take 500 mg by mouth. Indications: muscle spasm      oxybutynin (DITROPAN) 5 mg tablet Take 5 mg by mouth three (3) times daily. Indications: overactive bladder      metoprolol succinate (TOPROL-XL) 25 mg XL tablet Take 1 Tab by mouth daily. Qty: 30 Tab, Refills: 6      nitroglycerin (NITROSTAT) 0.4 mg SL tablet 1 Tab by SubLINGual route every five (5) minutes as needed for Chest Pain. Up to 3 doses. Qty: 25 Tab, Refills: 5      aspirin delayed-release 81 mg tablet Take 1 Tab by mouth daily. Qty: 30 Tab, Refills: 5      amLODIPine (NORVASC) 10 mg tablet Take 1 Tab by mouth daily. Qty: 30 Tab, Refills: 5      losartan/hydrochlorothiazide (HYZAAR PO) Take  by mouth. GABAPENTIN PO Take 800 mg by mouth three (3) times daily. quetiapine fumarate (SEROQUEL PO) Take 150 mg by mouth nightly. At bedtime      linaclotide (LINZESS PO) Take 245 mg by mouth daily. Indications: adjunct therapy for irritable bowel syndrome      celecoxib (CELEBREX PO) Take  by mouth. tiZANidine (ZANAFLEX) 4 mg capsule tizanidine 4 mg capsule      omeprazole (PRILOSEC) 40 mg capsule Take 1 Cap by mouth daily.   Qty: 90 Cap, Refills: 3           _______________________________       Scribe Lenin Shoulder acting as a scribe for and in the presence of Karuna Mcdonough, DO      March 09, 2020 at 6:30 PM       Provider Attestation:      I personally performed the services described in the documentation, reviewed the documentation, as recorded by the scribe in my presence, and it accurately and completely records my words and actions.  March 09, 2020 at 6:30 PM - Brian MCFARLANE, DO       _______________________________

## 2020-03-09 NOTE — ED NOTES
Pt reports having tight CP that radiates down her left arm for the last few weeks and came in today after being told by Dr. Camacho Odonnell to be evaluated after taking 5 nitros since last night that gave her some relief. Pt reports the pain would continue to come back. States she is SOB and \"trying to capture this pain and get it under control. \"     Reports nausea, sweating, no vomiting     Has a hx of IBS and GERD

## 2020-03-09 NOTE — PROGRESS NOTES
TRANSFER - IN REPORT:    Verbal report received from LisetteRN(name) on Macy Betancourt  being received from ED(unit) for routine progression of care      Report consisted of patients Situation, Background, Assessment and   Recommendations(SBAR). Information from the following report(s) SBAR, Kardex, [facility] and Recent Results was reviewed with the receiving nurse. Opportunity for questions and clarification was provided. Assessment completed upon patients arrival to unit and care assumed. 1535 stat order for aspirin placed by dea Cohen physician to verify order as patient states she gets hives from this medication. [address] Dr. Francia Morales states to give the medication as ordered if she has a reaction to page the hospitalist.     1700 patient given aspirin had a chest pain episode after sing the bathroom but per telemetry no changes in patient condition. Patient stated chest pain was 10/10.  After med given 0/10

## 2020-03-09 NOTE — TELEPHONE ENCOUNTER
This patient called this am and complained of Chest pain. She says it's more frequent and stronger each time. She relayed to me that she has taken 6 nitro tablets. I explained that the max dose was 3 tabs and she says, \" well it takes the pain away every time I take one. \" I told the patient she needed to go to the ER or call 911. She says \"ok I am going now. \"

## 2020-03-10 LAB
ANION GAP SERPL CALC-SCNC: 7 MMOL/L (ref 3–18)
APTT PPP: 106.8 SEC (ref 23–36.4)
APTT PPP: 48.8 SEC (ref 23–36.4)
ATRIAL RATE: 74 BPM
BASOPHILS # BLD: 0.1 K/UL (ref 0–0.1)
BASOPHILS NFR BLD: 1 % (ref 0–2)
BUN SERPL-MCNC: 9 MG/DL (ref 7–18)
BUN/CREAT SERPL: 7 (ref 12–20)
CALCIUM SERPL-MCNC: 8.8 MG/DL (ref 8.5–10.1)
CALCULATED P AXIS, ECG09: 54 DEGREES
CALCULATED R AXIS, ECG10: 40 DEGREES
CALCULATED T AXIS, ECG11: -179 DEGREES
CHLORIDE SERPL-SCNC: 107 MMOL/L (ref 100–111)
CO2 SERPL-SCNC: 27 MMOL/L (ref 21–32)
CREAT SERPL-MCNC: 1.21 MG/DL (ref 0.6–1.3)
DIAGNOSIS, 93000: NORMAL
DIFFERENTIAL METHOD BLD: ABNORMAL
EOSINOPHIL # BLD: 0.4 K/UL (ref 0–0.4)
EOSINOPHIL NFR BLD: 4 % (ref 0–5)
ERYTHROCYTE [DISTWIDTH] IN BLOOD BY AUTOMATED COUNT: 13.9 % (ref 11.6–14.5)
GLUCOSE SERPL-MCNC: 93 MG/DL (ref 74–99)
HCT VFR BLD AUTO: 43.2 % (ref 35–45)
HGB BLD-MCNC: 13.8 G/DL (ref 12–16)
INR PPP: 1.1 (ref 0.8–1.2)
LYMPHOCYTES # BLD: 5 K/UL (ref 0.9–3.6)
LYMPHOCYTES NFR BLD: 41 % (ref 21–52)
MAGNESIUM SERPL-MCNC: 2.3 MG/DL (ref 1.6–2.6)
MCH RBC QN AUTO: 30.3 PG (ref 24–34)
MCHC RBC AUTO-ENTMCNC: 31.9 G/DL (ref 31–37)
MCV RBC AUTO: 94.9 FL (ref 74–97)
MONOCYTES # BLD: 1 K/UL (ref 0.05–1.2)
MONOCYTES NFR BLD: 8 % (ref 3–10)
NEUTS SEG # BLD: 5.8 K/UL (ref 1.8–8)
NEUTS SEG NFR BLD: 46 % (ref 40–73)
P-R INTERVAL, ECG05: 172 MS
PHOSPHATE SERPL-MCNC: 4.2 MG/DL (ref 2.5–4.9)
PLATELET # BLD AUTO: 314 K/UL (ref 135–420)
PMV BLD AUTO: 11.9 FL (ref 9.2–11.8)
POTASSIUM SERPL-SCNC: 4 MMOL/L (ref 3.5–5.5)
PROTHROMBIN TIME: 13.9 SEC (ref 11.5–15.2)
Q-T INTERVAL, ECG07: 376 MS
QRS DURATION, ECG06: 76 MS
QTC CALCULATION (BEZET), ECG08: 417 MS
RBC # BLD AUTO: 4.55 M/UL (ref 4.2–5.3)
SODIUM SERPL-SCNC: 141 MMOL/L (ref 136–145)
TROPONIN I SERPL-MCNC: <0.02 NG/ML (ref 0–0.04)
VENTRICULAR RATE, ECG03: 74 BPM
WBC # BLD AUTO: 12.3 K/UL (ref 4.6–13.2)

## 2020-03-10 PROCEDURE — 84100 ASSAY OF PHOSPHORUS: CPT

## 2020-03-10 PROCEDURE — 99218 HC RM OBSERVATION: CPT

## 2020-03-10 PROCEDURE — 85730 THROMBOPLASTIN TIME PARTIAL: CPT

## 2020-03-10 PROCEDURE — 85025 COMPLETE CBC W/AUTO DIFF WBC: CPT

## 2020-03-10 PROCEDURE — 65660000000 HC RM CCU STEPDOWN

## 2020-03-10 PROCEDURE — 74011250636 HC RX REV CODE- 250/636: Performed by: INTERNAL MEDICINE

## 2020-03-10 PROCEDURE — 96376 TX/PRO/DX INJ SAME DRUG ADON: CPT

## 2020-03-10 PROCEDURE — 96366 THER/PROPH/DIAG IV INF ADDON: CPT

## 2020-03-10 PROCEDURE — 83735 ASSAY OF MAGNESIUM: CPT

## 2020-03-10 PROCEDURE — 80061 LIPID PANEL: CPT

## 2020-03-10 PROCEDURE — 80048 BASIC METABOLIC PNL TOTAL CA: CPT

## 2020-03-10 PROCEDURE — 74011250637 HC RX REV CODE- 250/637: Performed by: HOSPITALIST

## 2020-03-10 PROCEDURE — 74011250636 HC RX REV CODE- 250/636: Performed by: HOSPITALIST

## 2020-03-10 PROCEDURE — 36415 COLL VENOUS BLD VENIPUNCTURE: CPT

## 2020-03-10 PROCEDURE — 84484 ASSAY OF TROPONIN QUANT: CPT

## 2020-03-10 PROCEDURE — 74011250637 HC RX REV CODE- 250/637: Performed by: INTERNAL MEDICINE

## 2020-03-10 PROCEDURE — 85610 PROTHROMBIN TIME: CPT

## 2020-03-10 RX ORDER — HEPARIN SODIUM 1000 [USP'U]/ML
3000 INJECTION, SOLUTION INTRAVENOUS; SUBCUTANEOUS ONCE
Status: COMPLETED | OUTPATIENT
Start: 2020-03-10 | End: 2020-03-10

## 2020-03-10 RX ORDER — GABAPENTIN 400 MG/1
800 CAPSULE ORAL 3 TIMES DAILY
Status: DISCONTINUED | OUTPATIENT
Start: 2020-03-10 | End: 2020-03-11 | Stop reason: HOSPADM

## 2020-03-10 RX ADMIN — HEPARIN SODIUM 3000 UNITS: 1000 INJECTION INTRAVENOUS; SUBCUTANEOUS at 01:39

## 2020-03-10 RX ADMIN — MORPHINE SULFATE 5 MG: 10 INJECTION, SOLUTION INTRAMUSCULAR; INTRAVENOUS at 19:29

## 2020-03-10 RX ADMIN — LOSARTAN POTASSIUM: 50 TABLET, FILM COATED ORAL at 14:27

## 2020-03-10 RX ADMIN — ATORVASTATIN CALCIUM 40 MG: 20 TABLET, FILM COATED ORAL at 22:30

## 2020-03-10 RX ADMIN — GABAPENTIN 800 MG: 400 CAPSULE ORAL at 17:54

## 2020-03-10 RX ADMIN — HEPARIN SODIUM 3000 UNITS: 1000 INJECTION INTRAVENOUS; SUBCUTANEOUS at 10:42

## 2020-03-10 RX ADMIN — AMLODIPINE BESYLATE 10 MG: 10 TABLET ORAL at 10:42

## 2020-03-10 RX ADMIN — PANTOPRAZOLE SODIUM 40 MG: 40 TABLET, DELAYED RELEASE ORAL at 17:54

## 2020-03-10 RX ADMIN — MORPHINE SULFATE 5 MG: 10 INJECTION, SOLUTION INTRAMUSCULAR; INTRAVENOUS at 04:17

## 2020-03-10 RX ADMIN — METOPROLOL SUCCINATE 25 MG: 50 TABLET, FILM COATED, EXTENDED RELEASE ORAL at 10:42

## 2020-03-10 RX ADMIN — PANTOPRAZOLE SODIUM 40 MG: 40 TABLET, DELAYED RELEASE ORAL at 10:42

## 2020-03-10 RX ADMIN — QUETIAPINE FUMARATE 150 MG: 100 TABLET ORAL at 22:31

## 2020-03-10 RX ADMIN — GABAPENTIN 800 MG: 400 CAPSULE ORAL at 22:30

## 2020-03-10 RX ADMIN — ASPIRIN 81 MG: 81 TABLET, COATED ORAL at 10:42

## 2020-03-10 NOTE — PROGRESS NOTES
[facility] - Progress Note  Admit Date: 3/9/2020  Patient seen and examined independently. She was admitted with chest pain and had a known abnormal nuclear stress test done earlier this month on 3/3/2020. Chest pain-free since admission. Plan is to proceed with cardiac catheterization with Dr. Shorty Wilson in the morning. Patient understands the nature of the testing and risks involved. Agree with assessment and plan as noted below. Haydee Ash MD  Assessment:     - Chest pain, initial Troponin negative and EKG with inferolateral ST-T wave changes consistent with tracing in Feb 2020  - Abnormal nuclear stress test 03/03/20: There is a defect that is small to moderate in size present in the apical inferior and apex location(s) that is partially reversible. The defect appears to be infarction with ian-infarct ischemia.  - Echo 03/03/20 with EF 35-50%, mild aortic regurgitation, no evidence of pulmonary hypertension  - Hypertension  - Dyslipidemia  - Obesity  - Tobacco use    Plan:     - Cardiac enzymes have remained negative, cath tomorrow. NPO after MN  - Continue with Heparin, ASA, atorvastatin, Toprol and Amlodipine    Subjective:     No new complaints.      Objective:      Patient Vitals for the past 8 hrs:   Temp Pulse Resp BP SpO2   03/10/20 1145 98.2 °F (36.8 °C) 80 18 135/80 98 %   03/10/20 0742 98.3 °F (36.8 °C) 85 18 140/78 99 %         Patient Vitals for the past 96 hrs:   Weight   03/09/20 1048 335 lb (152 kg)                  No intake or output data in the 24 hours ending 03/10/20 1341    Physical Exam:  General:  alert, cooperative, no distress  Neck:  nontender, no JVD  Lungs:  clear to auscultation bilaterally  Heart:  regular rate and rhythm, S1, S2 normal, no murmur, click, rub or gallop  Abdomen:  abdomen is soft without significant tenderness, masses, organomegaly or guarding  Extremities:  extremities normal, atraumatic, no cyanosis or edema    Data Review:     Labs: Results: Chemistry Recent Labs     03/10/20  0408 03/09/20  1046   GLU 93 103*    139   K 4.0 3.7    103   CO2 27 34*   BUN 9 11   CREA 1.21 1.25   CA 8.8 9.4   MG 2.3  --    PHOS 4.2  --    AGAP 7 2*   BUCR 7* 9*   AP  --  98   TP  --  8.0   ALB  --  3.7   GLOB  --  4.3*   AGRAT  --  0.9      CBC w/Diff Recent Labs     03/10/20  0408 03/09/20  1046   WBC 12.3 14.2*   RBC 4.55 4.55   HGB 13.8 14.1   HCT 43.2 43.1    342   GRANS 46 52   LYMPH 41 41   EOS 4 4      Cardiac Enzymes Lab Results   Component Value Date/Time    TROIQ <0.02 03/10/2020 04:08 AM    TROIQ <0.02 03/09/2020 11:00 PM    TROIQ <0.02 03/09/2020 04:45 PM      Coagulation Recent Labs     03/10/20  0730 03/10/20  0408 03/09/20  2300   PTP  --  13.9  --    INR  --  1.1  --    APTT 48.8*  --  35.0       Lipid Panel Lab Results   Component Value Date/Time    Cholesterol, total 176 01/04/2010 12:08 PM    HDL Cholesterol 45 01/04/2010 12:08 PM    LDL, calculated 109.4 (H) 01/04/2010 12:08 PM    VLDL, calculated 21.6 01/04/2010 12:08 PM    Triglyceride 108 01/04/2010 12:08 PM    CHOL/HDL Ratio 3.9 01/04/2010 12:08 PM      BNP No results found for: BNP, BNPP, XBNPT   Liver Enzymes Recent Labs     03/09/20  1046   TP 8.0   ALB 3.7   AP 98   SGOT 16      Digoxin    Thyroid Studies Lab Results   Component Value Date/Time    TSH 0.75 12/09/2009 09:01 AM          Signed By: Avtar Anguiano.  Margaret Garcia     March 10, 2020

## 2020-03-10 NOTE — PROGRESS NOTES
NUTRITION INITIAL ASSESSMENT/PLAN OF CARE      RECOMMENDATIONS:   1. Continue current diet, encouraged compliance  2. Monitor labs, weight and PO intake  3. RD to follow     GOALS:   1. PO intake meets >75% of protein/calorie needs by 3/17  2. Gradual weight loss (1-2 lb/wk) by 3/17    ASSESSMENT:   Wt status is classified as obese per Body mass index is 57.5 kg/m². Reported over 10 lb gain in past year and decrease in activity. Pt desires weight loss. Diagnosis: unstable angina, obese, systolic CMP, HTN, hx IBS, hx RA. Nutrition recommendations listed. RD to follow. Nutrition Diagnoses:   Overweight/obesity related to lack of PA/suspect poor diet habits PTA as evidenced by BMI of 57.5 and reports over 100 lb gain in past year    SUBJECTIVE/OBJECTIVE:   Pt seen for an initial assessment/MST. Pt admit with CP. Pt laying in bed eating lunch during time of assessment, pt reports to be feeling much better today. Pt states to only eat 1 good meal/day at home. Denies current n/v/d/c, does state nausea yesterday. No issues chewing/swallowing. NKFA. Pt reports  lb with over 100 lb gain in the past year. CBW: 335 lb 3/9. Pt reports she had been having CP/generalized pain and without improvement until she changed MDs. Pt desires weight loss. Talked basic cardiac principles and weight loss tip. Encouraged compliance. Will continue to monitor intake, weight, labs, need for further edu. Information Obtained from:    [x] Chart Review   [x] Patient   [] Family/Caregiver   [] Nurse/Physician   [] Interdisciplinary Meeting/Rounds    Diet: cardiac  Medications: [x] Reviewed  Noted: norvasc, lipitor, gabapentin, heparin, toprol-xl, tridil, protonix  Allergies: [x] Reviewed   Encounter Diagnoses     ICD-10-CM ICD-9-CM   1.  Unstable angina (HCC) I20.0 411.1     Past Medical History:   Diagnosis Date    ADHD     Bipolar disorder ([facility] [address]     Carpal tunnel syndrome     DJD (degenerative joint disease)     Fibromyalgia     Hypertension     IBS (irritable bowel syndrome)     Irregular heart beat     Rheumatoid arthritis (HCC)     Tobacco abuse       Labs:    Lab Results   Component Value Date/Time    Sodium 141 03/10/2020 04:08 AM    Potassium 4.0 03/10/2020 04:08 AM    Chloride 107 03/10/2020 04:08 AM    CO2 27 03/10/2020 04:08 AM    Anion gap 7 03/10/2020 04:08 AM    Glucose 93 03/10/2020 04:08 AM    BUN 9 03/10/2020 04:08 AM    Creatinine 1.21 03/10/2020 04:08 AM    Calcium 8.8 03/10/2020 04:08 AM    Magnesium 2.3 03/10/2020 04:08 AM    Phosphorus 4.2 03/10/2020 04:08 AM    Albumin 3.7 03/09/2020 10:46 AM     Lab Results   Component Value Date/Time    GLU 93 03/10/2020 04:08 AM    GLUCPOC 93 03/09/2020 09:09 PM     Lab Results   Component Value Date/Time    Cholesterol, total 176 01/04/2010 12:08 PM    HDL Cholesterol 45 01/04/2010 12:08 PM    LDL, calculated 109.4 (H) 01/04/2010 12:08 PM    VLDL, calculated 21.6 01/04/2010 12:08 PM    Triglyceride 108 01/04/2010 12:08 PM    CHOL/HDL Ratio 3.9 01/04/2010 12:08 PM     Anthropometrics: BMI (calculated): 57.5  Last 3 Recorded Weights in this Encounter    03/09/20 1048   Weight: 152 kg (335 lb)      Ht Readings from Last 1 Encounters:   03/09/20 5' 4\" (1.626 m)     Documented Weight History:  Weight Metrics 3/9/2020 3/3/2020 3/3/2020 2/24/2020 2/2/2020 11/24/2018 9/7/2018   Weight 335 lb 345 lb 335 lb 335 lb 330 lb 237 lb 237 lb   BMI 57.5 kg/m2 59.22 kg/m2 55.75 kg/m2 55.75 kg/m2 54.91 kg/m2 39.44 kg/m2 39.44 kg/m2     No data found. IBW: 120 lb %IBW: 279% UBW: 230 lb   [] Weight Loss [x] Weight Gain [] Weight Stable    Estimated Nutrition Needs: [x] MSJ  [] Other:  Calories: 2163 kcal ( x 1) Based on:   [x] Actual BW    Protein:   71-76 g (1.3-1.4 g/kg) Based on:   [x] IBW    Fluid:       1 mL/kcal     [x] No Cultural, Yazdanism or ethnic dietary need identified.     [] Cultural, Yazdanism and ethnic food preferences identified and addressed     Wt Status:  [] Normal (18.6 - 24.9) [] Underweight (<18.5) [] Overweight (25 - 29.9) [] Mild Obesity (30 - 34.9)  [] Moderate Obesity (35 - 39.9) [x] Morbid Obesity (40+)     Nutrition Problems Identified:   [] Suboptimal PO intake   [] Food Allergies  [] Difficulty chewing/swallowing/poor dentition  [] Constipation/Diarrhea   [] Nausea/Vomiting   [] None  [x] Other: obesity/weight gain    Plan:   [x] Therapeutic Diet  []  Obtained/adjusted food preferences/tolerances and/or snacks options   []  Supplements added   [] Occupational therapy following for feeding techniques  []  HS snack added   []  Modify diet texture   []  Modify diet for food allergies   []  Educate patient   []  Assist with menu selection   [x]  Monitor PO intake on meal rounds   [x]  Continue inpatient monitoring and intervention   [x]  Participated in discharge planning/Interdisciplinary rounds/Team meetings   []  Other:     Education Needs:   [] Not appropriate for teaching at this time due to:   [x] Identified and addressed encouraged diet compliance    Nutrition Monitoring and Evaluation:  [x] Continue ongoing monitoring and intervention  [] Other    Amena Easley

## 2020-03-10 NOTE — PROGRESS NOTES
[facility] pt care from BUSTER Coleman. Pt in bed, alert and oriented x 4. Not in any form of distress. Denies pain. Frequent use items and call bell within reach. Verbalized understanding to call for assistance. Bed locked in lowest position. PTT-35 administered bolus dose of 3,000 units and increased dose of 8 units/kg/hr to 10 units/kg/hr. Bedside and Verbal shift change report given to BUSTER Coleman (oncoming nurse) by Naman Garcia RN (offgoing nurse). Report included the following information SBAR, Kardex, Intake/Output, MAR and Recent Results.

## 2020-03-10 NOTE — PROGRESS NOTES
Internal Medicine Progress Note    Patient's Name: Pearl Gordon Date: 3/9/2020  Length of Stay: 1      Assessment/Plan     Principal Problem:    Unstable angina ([address] (3/9/2020)    Active Problems:    Obesity (3/9/2020)      AL (obstructive sleep apnea) (3/9/2020)      HTN (hypertension) (3/9/2020)      IBS (irritable bowel syndrome) (3/9/2020)      Rheumatoid arthritis ([address] (3/9/2020)      Tobacco abuse (3/9/2020)      UA  - appreciate cards  - cath tomorrow  - cont hep, nitro gtt per cards  - NPO p MN    AL  - will ordered qhs cpap    - Cont acceptable home medications for chronic conditions   - DVT protocol    I have personally reviewed all pertinent labs and films that have officially resulted over the last 24 hours. I have personally checked for all pending labs that are awaiting final results. Anticipated discharge: 1-2 days    HPI     Per H&P, \"Ms. Marlen Bridges is a 39 y.o.  female who is admitted for unstable angina. Patient presented to the emergency room with severe left-sided chest pain and dyspnea on exertion. Today about 10 AM the chest pain was so severe, she could not get her breath. Chest pain radiated to her back, down left arm and her neck. Seen in the emergency room here on 2/2/20 for chest pain and followed by Dr. Shayy Alvarez on 24th of last month she had nuclear stress test reported with some abnormality. ER MD called Dr. Shayy Alvarez who asked the patient to be admitted for further work-up. I talked to the cardiologist Dr. Shayy Alvarez who indicated he knows this patient, she is a frequent flyer to the ER and she had abnormal nuclear stress test and he is considering doing heart catheterization for her. Started on heparin and nitro drip in the ER. Patient told me that the chest pain ongoing for a month every day on on and off basis. Sometime has palpitation with it. She does not have cholesterol issues and she does not take medication for that.   She reports sometimes when she move her left shoulder and hold her arm for some time that prescription or her left side chest..  She used CPAP at night.  She still smoke about 7 to 10 cigarettes a day. Cardiac enzymes were negative in the ER.\"    Hospital Course     Trop remained negative. Plan for cath tomorrow.    Subjective     Pt s/e @ bedside. No major events overnight. Pt offers no complaints this AM. Denies CP or SOB. Denies abd pain, nvd.    Objective     Visit Vitals  /80   Pulse 80   Temp 98.2 °F (36.8 °C)   Resp 18   Ht 5' 4\" (1.626 m)   Wt 152 kg (335 lb)   SpO2 98%   Breastfeeding No   BMI 57.50 kg/m²       Physical Exam:  General Appearance: NAD, conversant  HENT: normocephalic/atraumatic, moist mucus membranes  Neck: No JVD, supple  Lungs: CTA with normal respiratory effort  CV: RRR, no m/r/g  Abdomen: soft, non-tender, normal bowel sounds  Extremities: no cyanosis, no peripheral edema  Neuro: No focal deficits, motor/sensory intact  Skin: Normal color, intact      Intake and Output:  Current Shift:  No intake/output data recorded.  Last three shifts:  No intake/output data recorded.    Lab/Data Reviewed:  BMP:   Lab Results   Component Value Date/Time     03/10/2020 04:08 AM    K 4.0 03/10/2020 04:08 AM     03/10/2020 04:08 AM    CO2 27 03/10/2020 04:08 AM    AGAP 7 03/10/2020 04:08 AM    GLU 93 03/10/2020 04:08 AM    BUN 9 03/10/2020 04:08 AM    CREA 1.21 03/10/2020 04:08 AM    GFRAA 59 (L) 03/10/2020 04:08 AM    GFRNA 49 (L) 03/10/2020 04:08 AM     CBC:   Lab Results   Component Value Date/Time    WBC 12.3 03/10/2020 04:08 AM    HGB 13.8 03/10/2020 04:08 AM    HCT 43.2 03/10/2020 04:08 AM     03/10/2020 04:08 AM       Imaging Reviewed:  No results found.    Medications Reviewed:  Current Facility-Administered Medications   Medication Dose Route Frequency   • gabapentin (NEURONTIN) capsule 800 mg  800 mg Oral TID   • linaCLOtide (LINZESS) capsule 145 mcg  145 mcg Oral DAILY   • QUEtiapine (SEROquel)  tablet 150 mg  150 mg Oral QHS    losartan/hydroCHLOROthiazide (HYZAAR) 100/25 mg   Oral DAILY    nitroglycerin (TRIDIL) 400 mcg/ml infusion  5 mcg/min IntraVENous CONTINUOUS    heparin 25,000 units in D5W 250 ml infusion  6-25 Units/kg/hr IntraVENous TITRATE    metoprolol succinate (TOPROL-XL) XL tablet 25 mg  25 mg Oral DAILY    atorvastatin (LIPITOR) tablet 40 mg  40 mg Oral QHS    amLODIPine (NORVASC) tablet 10 mg  10 mg Oral DAILY    aspirin delayed-release tablet 81 mg  81 mg Oral DAILY    nitroglycerin (NITROSTAT) tablet 0.4 mg  0.4 mg SubLINGual Q5MIN PRN    pantoprazole (PROTONIX) tablet 40 mg  40 mg Oral ACB&D    morphine 10 mg/ml injection 5 mg  5 mg IntraVENous Q3H PRN    naloxone (NARCAN) injection 0.4 mg  0.4 mg IntraVENous PRN    Gabapentin - Clarify patient's home regimen and enter order into [facility]  1 Each Other DAILY    Seroquel - Clarify patient's home regimen and enter order into [facility]  1 Each Other DAILY         [place] [address]  Office:  [phone]  Pager: [phone]

## 2020-03-11 LAB
APTT PPP: 54.4 SEC (ref 23–36.4)
BASOPHILS # BLD: 0.1 K/UL (ref 0–0.1)
BASOPHILS NFR BLD: 1 % (ref 0–2)
CHOLEST SERPL-MCNC: 211 MG/DL
DIFFERENTIAL METHOD BLD: ABNORMAL
EOSINOPHIL # BLD: 0.5 K/UL (ref 0–0.4)
EOSINOPHIL NFR BLD: 3 % (ref 0–5)
ERYTHROCYTE [DISTWIDTH] IN BLOOD BY AUTOMATED COUNT: 14 % (ref 11.6–14.5)
HCT VFR BLD AUTO: 41.2 % (ref 35–45)
HDLC SERPL-MCNC: 34 MG/DL (ref 40–60)
HDLC SERPL: 6.2 {RATIO} (ref 0–5)
HGB BLD-MCNC: 13.1 G/DL (ref 12–16)
LDLC SERPL CALC-MCNC: 126.2 MG/DL (ref 0–100)
LIPID PROFILE,FLP: ABNORMAL
LYMPHOCYTES # BLD: 4.6 K/UL (ref 0.9–3.6)
LYMPHOCYTES NFR BLD: 34 % (ref 21–52)
MCH RBC QN AUTO: 30.1 PG (ref 24–34)
MCHC RBC AUTO-ENTMCNC: 31.8 G/DL (ref 31–37)
MCV RBC AUTO: 94.7 FL (ref 74–97)
MONOCYTES # BLD: 1 K/UL (ref 0.05–1.2)
MONOCYTES NFR BLD: 7 % (ref 3–10)
NEUTS SEG # BLD: 7.4 K/UL (ref 1.8–8)
NEUTS SEG NFR BLD: 55 % (ref 40–73)
PLATELET # BLD AUTO: 304 K/UL (ref 135–420)
PMV BLD AUTO: 11.7 FL (ref 9.2–11.8)
RBC # BLD AUTO: 4.35 M/UL (ref 4.2–5.3)
TRIGL SERPL-MCNC: 254 MG/DL (ref ?–150)
VLDLC SERPL CALC-MCNC: 50.8 MG/DL
WBC # BLD AUTO: 13.6 K/UL (ref 4.6–13.2)

## 2020-03-11 PROCEDURE — 74011250637 HC RX REV CODE- 250/637: Performed by: HOSPITALIST

## 2020-03-11 PROCEDURE — 77030012597: Performed by: INTERNAL MEDICINE

## 2020-03-11 PROCEDURE — 92978 ENDOLUMINL IVUS OCT C 1ST: CPT | Performed by: INTERNAL MEDICINE

## 2020-03-11 PROCEDURE — 74011250636 HC RX REV CODE- 250/636: Performed by: HOSPITALIST

## 2020-03-11 PROCEDURE — 99152 MOD SED SAME PHYS/QHP 5/>YRS: CPT | Performed by: INTERNAL MEDICINE

## 2020-03-11 PROCEDURE — 77030013797 HC KT TRNSDUC PRSSR EDWD -A: Performed by: INTERNAL MEDICINE

## 2020-03-11 PROCEDURE — 77030029997 HC DEV COM RDL R BND TELE -B: Performed by: INTERNAL MEDICINE

## 2020-03-11 PROCEDURE — 77030012468 HC VLV BLEEDBK CNTRL ABBT -B: Performed by: INTERNAL MEDICINE

## 2020-03-11 PROCEDURE — 77030003629 HC NDL PERC VASC COOK -A: Performed by: INTERNAL MEDICINE

## 2020-03-11 PROCEDURE — 77030013761 HC KT HRT LFT ANGI -B: Performed by: INTERNAL MEDICINE

## 2020-03-11 PROCEDURE — 74011250636 HC RX REV CODE- 250/636: Performed by: INTERNAL MEDICINE

## 2020-03-11 PROCEDURE — 76210000001 HC OR PH I REC 2.5 TO 3 HR: Performed by: INTERNAL MEDICINE

## 2020-03-11 PROCEDURE — 74011000250 HC RX REV CODE- 250: Performed by: INTERNAL MEDICINE

## 2020-03-11 PROCEDURE — 99153 MOD SED SAME PHYS/QHP EA: CPT | Performed by: INTERNAL MEDICINE

## 2020-03-11 PROCEDURE — 85730 THROMBOPLASTIN TIME PARTIAL: CPT

## 2020-03-11 PROCEDURE — C1769 GUIDE WIRE: HCPCS | Performed by: INTERNAL MEDICINE

## 2020-03-11 PROCEDURE — 96366 THER/PROPH/DIAG IV INF ADDON: CPT

## 2020-03-11 PROCEDURE — 74011250637 HC RX REV CODE- 250/637: Performed by: INTERNAL MEDICINE

## 2020-03-11 PROCEDURE — 96376 TX/PRO/DX INJ SAME DRUG ADON: CPT

## 2020-03-11 PROCEDURE — C1887 CATHETER, GUIDING: HCPCS | Performed by: INTERNAL MEDICINE

## 2020-03-11 PROCEDURE — C1894 INTRO/SHEATH, NON-LASER: HCPCS | Performed by: INTERNAL MEDICINE

## 2020-03-11 PROCEDURE — 36415 COLL VENOUS BLD VENIPUNCTURE: CPT

## 2020-03-11 PROCEDURE — 99218 HC RM OBSERVATION: CPT

## 2020-03-11 PROCEDURE — C1760 CLOSURE DEV, VASC: HCPCS | Performed by: INTERNAL MEDICINE

## 2020-03-11 PROCEDURE — 77030015766: Performed by: INTERNAL MEDICINE

## 2020-03-11 PROCEDURE — 93454 CORONARY ARTERY ANGIO S&I: CPT | Performed by: INTERNAL MEDICINE

## 2020-03-11 PROCEDURE — 74011636320 HC RX REV CODE- 636/320: Performed by: INTERNAL MEDICINE

## 2020-03-11 PROCEDURE — 85025 COMPLETE CBC W/AUTO DIFF WBC: CPT

## 2020-03-11 RX ORDER — ATORVASTATIN CALCIUM 40 MG/1
40 TABLET, FILM COATED ORAL
Qty: 30 TAB | Refills: 0 | Status: SHIPPED | OUTPATIENT
Start: 2020-03-11 | End: 2020-04-09 | Stop reason: SDUPTHER

## 2020-03-11 RX ORDER — METOPROLOL SUCCINATE 50 MG/1
50 TABLET, EXTENDED RELEASE ORAL DAILY
Status: DISCONTINUED | OUTPATIENT
Start: 2020-03-12 | End: 2020-03-11 | Stop reason: HOSPADM

## 2020-03-11 RX ORDER — HEPARIN SODIUM 200 [USP'U]/100ML
INJECTION, SOLUTION INTRAVENOUS AS NEEDED
Status: DISCONTINUED | OUTPATIENT
Start: 2020-03-11 | End: 2020-03-11 | Stop reason: HOSPADM

## 2020-03-11 RX ORDER — HEPARIN SODIUM 1000 [USP'U]/ML
3000 INJECTION, SOLUTION INTRAVENOUS; SUBCUTANEOUS ONCE
Status: COMPLETED | OUTPATIENT
Start: 2020-03-11 | End: 2020-03-11

## 2020-03-11 RX ORDER — OXYCODONE AND ACETAMINOPHEN 5; 325 MG/1; MG/1
1 TABLET ORAL
Status: COMPLETED | OUTPATIENT
Start: 2020-03-11 | End: 2020-03-11

## 2020-03-11 RX ORDER — LIDOCAINE HYDROCHLORIDE 10 MG/ML
INJECTION INFILTRATION; PERINEURAL AS NEEDED
Status: DISCONTINUED | OUTPATIENT
Start: 2020-03-11 | End: 2020-03-11 | Stop reason: HOSPADM

## 2020-03-11 RX ORDER — MIDAZOLAM HYDROCHLORIDE 1 MG/ML
INJECTION, SOLUTION INTRAMUSCULAR; INTRAVENOUS AS NEEDED
Status: DISCONTINUED | OUTPATIENT
Start: 2020-03-11 | End: 2020-03-11 | Stop reason: HOSPADM

## 2020-03-11 RX ORDER — HEPARIN SODIUM 1000 [USP'U]/ML
INJECTION, SOLUTION INTRAVENOUS; SUBCUTANEOUS AS NEEDED
Status: DISCONTINUED | OUTPATIENT
Start: 2020-03-11 | End: 2020-03-11 | Stop reason: HOSPADM

## 2020-03-11 RX ORDER — METOPROLOL SUCCINATE 50 MG/1
50 TABLET, EXTENDED RELEASE ORAL DAILY
Qty: 30 TAB | Refills: 0 | Status: SHIPPED | OUTPATIENT
Start: 2020-03-12 | End: 2020-04-01 | Stop reason: SDUPTHER

## 2020-03-11 RX ORDER — FENTANYL CITRATE 50 UG/ML
INJECTION, SOLUTION INTRAMUSCULAR; INTRAVENOUS AS NEEDED
Status: DISCONTINUED | OUTPATIENT
Start: 2020-03-11 | End: 2020-03-11 | Stop reason: HOSPADM

## 2020-03-11 RX ADMIN — PANTOPRAZOLE SODIUM 40 MG: 40 TABLET, DELAYED RELEASE ORAL at 16:59

## 2020-03-11 RX ADMIN — OXYCODONE HYDROCHLORIDE AND ACETAMINOPHEN 1 TABLET: 5; 325 TABLET ORAL at 16:58

## 2020-03-11 RX ADMIN — HEPARIN SODIUM AND DEXTROSE 14 UNITS/KG/HR: 10000; 5 INJECTION INTRAVENOUS at 07:53

## 2020-03-11 RX ADMIN — MORPHINE SULFATE 5 MG: 10 INJECTION, SOLUTION INTRAMUSCULAR; INTRAVENOUS at 03:30

## 2020-03-11 RX ADMIN — MORPHINE SULFATE 5 MG: 10 INJECTION, SOLUTION INTRAMUSCULAR; INTRAVENOUS at 08:08

## 2020-03-11 RX ADMIN — GABAPENTIN 800 MG: 400 CAPSULE ORAL at 16:59

## 2020-03-11 RX ADMIN — HEPARIN SODIUM 3000 UNITS: 1000 INJECTION INTRAVENOUS; SUBCUTANEOUS at 08:07

## 2020-03-11 RX ADMIN — HEPARIN SODIUM AND DEXTROSE 12 UNITS/KG/HR: 10000; 5 INJECTION INTRAVENOUS at 04:10

## 2020-03-11 NOTE — PROGRESS NOTES
Problem: Discharge Planning  Goal: *Discharge to safe environment  Outcome: Progressing Towards Goal     Problem: Falls - Risk of  Goal: *Absence of Falls  Description  Document Nancy Martinezuro Fall Risk and appropriate interventions in the flowsheet.   Outcome: Progressing Towards Goal  Note: Fall Risk Interventions:            Medication Interventions: Bed/chair exit alarm, Patient to call before getting OOB                   Problem: Patient Education: Go to Patient Education Activity  Goal: Patient/Family Education  Outcome: Progressing Towards Goal     Problem: Nutrition Deficit  Goal: *Optimize nutritional status  Outcome: Progressing Towards Goal     Problem: Pain  Goal: *Control of Pain  Outcome: Progressing Towards Goal

## 2020-03-11 NOTE — PROGRESS NOTES
[facility] - Progress Note  Admit Date: 3/9/2020    Assessment:     - Chest pain, initial Troponin negative and EKG with inferolateral ST-T wave changes consistent with tracing in Feb 2020  - Abnormal nuclear stress test 03/03/20: There is a defect that is small to moderate in size present in the apical inferior and apex location(s) that is partially reversible. The defect appears to be infarction with ian-infarct ischemia.  - Echo 03/03/20 with EF 35-50%, mild aortic regurgitation, no evidence of pulmonary hypertension  - Hypertension  - Dyslipidemia  - Obesity  - Tobacco use    Plan:     - Cardiac enzymes have remained negative, cath today  - DW patient regarding management strategy which includes medical management vs. Ischemia evaluation ( non-invasive vs. Invasive). Risk, benefit and alternatives of each strategy discussed in detail. Risk, benefit, complication of LHC and possible PCI ( including but not limited to bleeding, vascular trauma requiring surgery,  infection, heart failure, stroke, MI, emergent bypass surgery, severe allergic reactions, kidney failure, dialysis and death ) were discussed with patient and willing to proceed with procedure. Will be using moderate sedation. This was discussed extensively with patient, daughter, mother and grandfather at bedside in detail. They are all in agreement          Subjective:     No new complaints.  No CP currently  Anxious to get cath done    Objective:      Patient Vitals for the past 8 hrs:   Temp Pulse Resp BP SpO2   03/11/20 0729 98.3 °F (36.8 °C) 73 18 102/72 99 %   03/11/20 0432 98.4 °F (36.9 °C) 66 18 110/75 97 %         Patient Vitals for the past 96 hrs:   Weight   03/09/20 1048 335 lb (152 kg)                  No intake or output data in the 24 hours ending 03/11/20 1010    Physical Exam:  General:  alert, cooperative, no distress  Neck:  nontender, no JVD  Lungs:  clear to auscultation bilaterally  Heart:  regular rate and rhythm, S1, S2 normal, no murmur, click, rub or gallop  Abdomen:  abdomen is soft without significant tenderness, masses, organomegaly or guarding  Extremities:  extremities normal, atraumatic, no cyanosis or edema    Data Review:     Labs: Results:       Chemistry Recent Labs     03/10/20  0408 03/09/20  1046   GLU 93 103*    139   K 4.0 3.7    103   CO2 27 34*   BUN 9 11   CREA 1.21 1.25   CA 8.8 9.4   MG 2.3  --    PHOS 4.2  --    AGAP 7 2*   BUCR 7* 9*   AP  --  98   TP  --  8.0   ALB  --  3.7   GLOB  --  4.3*   AGRAT  --  0.9      CBC w/Diff Recent Labs     03/11/20  0436 03/10/20  0408 03/09/20  1046   WBC 13.6* 12.3 14.2*   RBC 4.35 4.55 4.55   HGB 13.1 13.8 14.1   HCT 41.2 43.2 43.1    314 342   GRANS 55 46 52   LYMPH 34 41 41   EOS 3 4 4      Cardiac Enzymes No results found for: CPK, CK, CKMMB, CKMB, RCK3, CKMBT, CKNDX, CKND1, JAYME, TROPT, TROIQ, BEE, TROPT, TNIPOC, BNP, BNPP   Coagulation Recent Labs     03/11/20  0436 03/10/20  1729  03/10/20  0408   PTP  --   --   --  13.9   INR  --   --   --  1.1   APTT 54.4* 106.8*   < >  --     < > = values in this interval not displayed.        Lipid Panel Lab Results   Component Value Date/Time    Cholesterol, total 176 01/04/2010 12:08 PM    HDL Cholesterol 45 01/04/2010 12:08 PM    LDL, calculated 109.4 (H) 01/04/2010 12:08 PM    VLDL, calculated 21.6 01/04/2010 12:08 PM    Triglyceride 108 01/04/2010 12:08 PM    CHOL/HDL Ratio 3.9 01/04/2010 12:08 PM      BNP No results found for: BNP, BNPP, XBNPT   Liver Enzymes Recent Labs     03/09/20  1046   TP 8.0   ALB 3.7   AP 98   SGOT 16      Digoxin    Thyroid Studies Lab Results   Component Value Date/Time    TSH 0.75 12/09/2009 09:01 AM          Signed By: Jean Kaur MD     March 11, 2020

## 2020-03-11 NOTE — DISCHARGE SUMMARY
[facility]  Hospitalist Division    Discharge Summary    Patient: Sarah Evans MRN: [id number]  [facility]: [id number]    YOB: 1975  Age: 39 y.o. Sex: female    DOA: 3/9/2020 LOS:  LOS: 2 days   Discharge Date:      Admission Diagnoses: Unstable angina ([facility] [address] [I20.0]    Discharge Diagnoses:  Principal Problem:    Unstable angina ([facility] [address] (3/9/2020)    Active Problems:    Obesity (3/9/2020)      AL (obstructive sleep apnea) (3/9/2020)      HTN (hypertension) (3/9/2020)      IBS (irritable bowel syndrome) (3/9/2020)      Rheumatoid arthritis ([facility] [address] (3/9/2020)      Tobacco abuse (3/9/2020)        Discharge Condition: Stable    Discharge To:  Home    Consults: Cardiology    HPI: Per H&P, \"Ms. Turpin is a 39 y.o.   female who is admitted for unstable angina. Patient presented to the emergency room with severe left-sided chest pain and dyspnea on exertion.  Today about 10 AM the chest pain was so severe, she could not get her breath.  Chest pain radiated to her back, down left arm and her neck.  Seen in the emergency room here on 2/2/20 for chest pain and followed by Dr. Alessio Hubbard on 24th of last month she had nuclear stress test reported with some abnormality.  ER MD called Dr. Alessio Hubbard who asked the patient to be admitted for further work-up.  I talked to the cardiologist Dr. Alessio Hubbard who indicated he knows this patient, she is a frequent flyer to the ER and she had abnormal nuclear stress test and he is considering doing heart catheterization for her.  Started on heparin and nitro drip in the ER.  Patient told me that the chest pain ongoing for a month every day on on and off basis.  Sometime has palpitation with it. Serg Beasley does not have cholesterol issues and she does not take medication for that.  She reports sometimes when she move her left shoulder and hold her arm for some time that prescription or her left side chest..  She used CPAP at night.  She still smoke about 7 to 10 cigarettes a day. Cardiac enzymes were negative in the ER. \"    Hospital Course: She was started on heparin gtt. Trop remained negative. She had cardiac cath on 3/11 - 60% stenosis of proximal LAD, moderate plaque burdem detected. No interventions. Statin started, BB adjusted. VS and labs stable for discharge. Physical Exam:  General appearance: alert, cooperative, no distress, appears stated age  Head: Normocephalic, without obvious abnormality, atraumatic  Lungs: clear to auscultation bilaterally  Heart: regular rate and rhythm, S1, S2 normal, no murmur, click, rub or gallop  Extremities: no cyanosis or edema  Skin: Skin color, texture normal. No rashes or lesions    Significant Diagnostic Studies: All lab results for the last 24 hours reviewed. Xr Chest Port    Result Date: 3/9/2020  EXAM: Portable chest radiograph 3/9/2020 CLINICAL INDICATION/HISTORY: Chest pain radiating into the left upper extremity. TECHNIQUE: A semierect portable radiograph was obtained. COMPARISON: 2/2/2020. FINDINGS: Evaluation is limited by patient body habitus. The cardiomediastinal contours are unchanged. The lungs are clear. There is no pneumothorax or pleural effusion. IMPRESSION:  1. No acute pulmonary disease. Procedures:   CORONARY ANGIOGRAPHY   LEFT HEART CATH   LEFT VENTRICULOGRAPHY     Coronary Findings     Diagnostic   Dominance: Right   Left Main   The vessel is angiographically normal.   Left Anterior Descending   Proximalmid eccentric 50-60% borderline narrowing. PEDRO-3 flow distally IFR of LAD performed which was 0.90, suggesting physiologically not significant stenosis   Prox LAD lesion 60% stenosed. . Pressure wire/iFR was perfromed on the lesion. Flow Reserve: 0.9. There is moderate plaque burden detected. Left Circumflex   The vessel exhibits minimal luminal irregularities.  OM1: Minimal luminal irregularities otherwise normal   Right Coronary Artery   The vessel exhibits minimal luminal irregularities. Intervention     No interventions have been documented. Discharge Medications:     Current Discharge Medication List      START taking these medications    Details   atorvastatin (LIPITOR) 40 mg tablet Take 1 Tab by mouth nightly. Qty: 30 Tab, Refills: 0         CONTINUE these medications which have CHANGED    Details   metoprolol succinate (TOPROL-XL) 50 mg XL tablet Take 1 Tab by mouth daily. Qty: 30 Tab, Refills: 0         CONTINUE these medications which have NOT CHANGED    Details   OLANZapine (ZYPREXA) 15 mg tablet Take 15 mg by mouth nightly. Indications: bipolar disorder in remission      chlorzoxazone (PARAFON FORTE) 500 mg tablet Take 500 mg by mouth. Indications: muscle spasm      oxybutynin (DITROPAN) 5 mg tablet Take 5 mg by mouth three (3) times daily. Indications: overactive bladder      nitroglycerin (NITROSTAT) 0.4 mg SL tablet 1 Tab by SubLINGual route every five (5) minutes as needed for Chest Pain. Up to 3 doses. Qty: 25 Tab, Refills: 5      aspirin delayed-release 81 mg tablet Take 1 Tab by mouth daily. Qty: 30 Tab, Refills: 5      amLODIPine (NORVASC) 10 mg tablet Take 1 Tab by mouth daily. Qty: 30 Tab, Refills: 5      losartan/hydrochlorothiazide (HYZAAR PO) Take  by mouth. GABAPENTIN PO Take 800 mg by mouth three (3) times daily. quetiapine fumarate (SEROQUEL PO) Take 150 mg by mouth nightly. At bedtime      linaclotide (LINZESS PO) Take 145 mcg by mouth daily. Indications: adjunct therapy for irritable bowel syndrome      tiZANidine (ZANAFLEX) 4 mg capsule tizanidine 4 mg capsule      omeprazole (PRILOSEC) 40 mg capsule Take 1 Cap by mouth daily.   Qty: 90 Cap, Refills: 3         STOP taking these medications       celecoxib (CELEBREX PO) Comments:   Reason for Stopping:               Activity: Activity as tolerated    Diet: Cardiac Diet    Wound Care: None needed    Follow-up: 1 week with PCP, 3-4 weeks with PCP    Discharge time: >35 minutes    Chang Douglas PA-C  [facility]  Office:  [phone]  Pager: [phone]      3/11/2020, 5:47 PM

## 2020-03-11 NOTE — PROGRESS NOTES
[address] [place]  pt care from BUSTER Coleman. Pt in bed, alert and oriented x 4. Not in any form of distress. Denies pain. Frequent use items and call bell within reach. Verbalized understanding to call for assistance. Bed locked in lowest position. Morphine administered for pain. Patient tolerated well. No changes throughout the night. Patient accidentally pulled her IV line on the right AC. Inserted a new IV line (22) on the right arm. Chlorhexidine wash completed. Pre-op checklist completed. AM PTT - 54.4. Administered 3,000 units heparin bolus. Increased dose of heparin drip to 14 units/kg/hr from 12 units/kg/hr. Bedside and Verbal shift change report given to Maxine Walsh RN (oncoming nurse) by Maite Garcia RN (offgoing nurse). Report included the following information SBAR, Kardex, Intake/Output, MAR and Recent Results.

## 2020-03-11 NOTE — PERIOP NOTES
Recd care of pt from cath lab via bed. Attached to monitor. VSS. Cath lab report appreciated. Will cont to monitor. T/R band noted right wrist.  4x4 transparent dsg right groin C/D/I.    1205  2 ml of air removed from TR band. No bleeding, swelling or hematoma noted. Will cont to monitor. 1215  2 ml of air removed from TR band. No bleeding, swelling or hematoma noted. Will cont to monitor. 1225  2 ml air removed from TR band. No bleeding, swelling or hematoma noted. Will cont to monitor. 1300  Pt resting quietly. No c/o voiced. Tolerating po fluids well. Will cont to monitor. 1415  TRANSFER - OUT REPORT:    Verbal report given to [facility], RN (name) on Dorothy Shanks  being transferred to [facility] (unit) for routine progression of care       Report consisted of patients Situation, Background, Assessment and   Recommendations(SBAR). Information from the following report(s) SBAR, Procedure Summary, Intake/Output and Cardiac Rhythm sinus renita inverted T wave was reviewed with the receiving nurse. Lines:       Opportunity for questions and clarification was provided.       Patient transported with:   Monitor  Registered Nurse  Tech

## 2020-03-11 NOTE — DISCHARGE INSTRUCTIONS
Cardiac Catheterization/Angiography Discharge Instructions    *Check the puncture site frequently for swelling or bleeding. If you see any bleeding, lie down and apply pressure over the area with a clean town or washcloth. Notify your doctor for any redness, swelling, drainage or oozing from the puncture site. Notify your doctor for any fever or chills. *If the leg with the puncture becomes cold, numb or painful, call Dr Rodolfo Jauregui at  [phone]. *Activity should be limited for the next 48 hours. Climb stairs as little as possible and avoid any stooping, bending or strenuous activity for 48 hours. No heavy lifting (anything over 10 pounds) for three days. *Do not drive for 24 hours. *You may resume your usual diet. Drink more fluids than usual.    *Have a responsible person drive you home and stay with you for at least 24 hours after your heart catheterization/angiography. *You may remove the bandage from your right groin in 24 hours. You may shower in 24 hours. No tub baths, hot tubs or swimming for one week. Do not place any lotions, creams, powders, ointments over the puncture site for one week. You may place a clean band-aid over the puncture site each day for 5 days. Change this daily. DISCHARGE SUMMARY from Nurse    PATIENT INSTRUCTIONS:    After general anesthesia or intravenous sedation, for 24 hours or while taking prescription Narcotics:  · Limit your activities  · Do not drive and operate hazardous machinery  · Do not make important personal or business decisions  · Do  not drink alcoholic beverages  · If you have not urinated within 8 hours after discharge, please contact your surgeon on call.     Report the following to your surgeon:  · Excessive pain, swelling, redness or odor of or around the surgical area  · Temperature over 100.5  · Nausea and vomiting lasting longer than 4 hours or if unable to take medications  · Any signs of decreased circulation or nerve impairment to extremity: change in color, persistent  numbness, tingling, coldness or increase pain  · Any questions    What to do at Home:  Recommended activity: Activity as tolerated and No heavy lifting, pushing, or pulling for 1 week, no greater than 10 pounds.    If you experience any of the following symptoms swelling, pain, or bleeding at the right groin puncture site, please follow up with emergency room/911.    *  Please give a list of your current medications to your Primary Care Provider.    *  Please update this list whenever your medications are discontinued, doses are      changed, or new medications (including over-the-counter products) are added.    *  Please carry medication information at all times in case of emergency situations.    These are general instructions for a healthy lifestyle:    No smoking/ No tobacco products/ Avoid exposure to second hand smoke  Surgeon General's Warning:  Quitting smoking now greatly reduces serious risk to your health.    Obesity, smoking, and sedentary lifestyle greatly increases your risk for illness    A healthy diet, regular physical exercise & weight monitoring are important for maintaining a healthy lifestyle    You may be retaining fluid if you have a history of heart failure or if you experience any of the following symptoms:  Weight gain of 3 pounds or more overnight or 5 pounds in a week, increased swelling in our hands or feet or shortness of breath while lying flat in bed.  Please call your doctor as soon as you notice any of these symptoms; do not wait until your next office visit.        The discharge information has been reviewed with the patient.  The patient verbalized understanding.  Discharge medications reviewed with the patient and appropriate educational materials and side effects teaching were provided.    Patient armband removed and shredded.

## 2020-03-11 NOTE — PROGRESS NOTES
0800 Received pt in be, seems in a good mood, O2 for comfort. On Hep and Nitro drip. To cath lab today, pt on NPO. Next APTt to be drawn at 1400. Pt complaining of chest pain, will see if Morphine can be given at this time. Will monitor pt's VS.  
 
1000 Pt to cath lab. Heparin drip and Nitro drip on hold per Dr. Shayy Alvarez. Kept on NPO. Consent c/o Cath Lab 
 
1500 Back to room. Denies any chest pain,pain on surgical site. Dressings c/d/i. Pt with immobilizer on right wrist, pt informed it has to stay until tonight, should not move wrist too much to prevent bleeding. On RA, may go OOB. VS within normal range. 46 Dr. Shayy Alvarez called to check on pt's O2 sat and pulse on right hand. 1630 Complaining of pain on the wrist, MD ordered 1 time Percocet. Pt upset wants to have sukhdev pain med, explain to pt that she is going home and should only be on pills. 1800 Discharge order in place. Will D/C pt.

## 2020-03-16 ENCOUNTER — OFFICE VISIT (OUTPATIENT)
Dept: CARDIOLOGY CLINIC | Age: 45
End: 2020-03-16

## 2020-03-16 VITALS
SYSTOLIC BLOOD PRESSURE: 117 MMHG | DIASTOLIC BLOOD PRESSURE: 70 MMHG | WEIGHT: 293 LBS | BODY MASS INDEX: 59.56 KG/M2 | OXYGEN SATURATION: 97 % | HEART RATE: 77 BPM

## 2020-03-16 DIAGNOSIS — M79.601 PAIN IN INFERIOR RIGHT UPPER EXTREMITY: Primary | ICD-10-CM

## 2020-03-16 NOTE — PROGRESS NOTES
[facility]    Ms. Any Powers is a 39 y.o. female with multiple medical problem    Patient is here today for follow-up appointment. She was admitted to the hospital recently after having a chest pain. She underwent cardiac catheterization. There was no significant obstructive disease to intervene upon. She went home. Since she has gone home, she has been having episodic right arm discomfort however she has no limitation of movement of right upper extremity in the fingers. Sensation is intact. She denies any swelling of right upper extremity. Since she has gone home she has no episode of chest pain. She is taking her medications regularly  Denies any nausea, vomiting, abdominal pain, fever, chills, sputum production. No hematuria or other bleeding complaints    Past Medical History:   Diagnosis Date    ADHD     Bipolar disorder ([address]     CAD (coronary artery disease)     Cath (03.20). Prox LAD 50-60% borderline stenosis, physiologically not significant iFR 0.9    Carpal tunnel syndrome     DJD (degenerative joint disease)     Fibromyalgia     Hypertension     IBS (irritable bowel syndrome)     Rheumatoid arthritis (HCC)     Tobacco abuse          Past Surgical History:   Procedure Laterality Date    HX HYSTERECTOMY         Current Outpatient Medications   Medication Sig    atorvastatin (LIPITOR) 40 mg tablet Take 1 Tab by mouth nightly.  metoprolol succinate (TOPROL-XL) 50 mg XL tablet Take 1 Tab by mouth daily.  OLANZapine (ZYPREXA) 15 mg tablet Take 15 mg by mouth nightly. Indications: bipolar disorder in remission    chlorzoxazone (PARAFON FORTE) 500 mg tablet Take 500 mg by mouth. Indications: muscle spasm    oxybutynin (DITROPAN) 5 mg tablet Take 5 mg by mouth three (3) times daily.  Indications: overactive bladder    nitroglycerin (NITROSTAT) 0.4 mg SL tablet 1 Tab by SubLINGual route every five (5) minutes as needed for Chest Pain. Up to 3 doses.  aspirin delayed-release 81 mg tablet Take 1 Tab by mouth daily.  amLODIPine (NORVASC) 10 mg tablet Take 1 Tab by mouth daily.  tiZANidine (ZANAFLEX) 4 mg capsule tizanidine 4 mg capsule    omeprazole (PRILOSEC) 40 mg capsule Take 1 Cap by mouth daily.  losartan/hydrochlorothiazide (HYZAAR PO) Take  by mouth.  GABAPENTIN PO Take 800 mg by mouth three (3) times daily.  quetiapine fumarate (SEROQUEL PO) Take 150 mg by mouth nightly. At bedtime    linaclotide (LINZESS PO) Take 145 mcg by mouth daily. Indications: adjunct therapy for irritable bowel syndrome     No current facility-administered medications for this visit. Allergies and Sensitivities:  Allergies   Allergen Reactions    Aspirin Hives    Pcn [Penicillins] Hives       Family History:  No family history on file. Social History:  Social History     Tobacco Use    Smoking status: Current Every Day Smoker     Packs/day: 0.25    Smokeless tobacco: Never Used   Substance Use Topics    Alcohol use: Yes     Comment: rarely    Drug use: No     She  reports that she has been smoking. She has been smoking about 0.25 packs per day. She has never used smokeless tobacco.  She  reports current alcohol use. Review of Systems:  Cardiac symptoms as noted above in HPI. All others negative. Denies fatigue, malaise, skin rash, joint pain, blurring vision, photophobia, neck pain, hemoptysis, chronic cough, nausea, vomiting, hematuria, burning micturition, BRBPR, chronic headaches. Physical Exam:  BP Readings from Last 3 Encounters:   03/16/20 117/70   03/11/20 111/60   03/03/20 (!) 161/94         Pulse Readings from Last 3 Encounters:   03/16/20 77   03/11/20 66   02/24/20 81          Wt Readings from Last 3 Encounters:   03/16/20 347 lb (157.4 kg)   03/09/20 335 lb (152 kg)   03/03/20 345 lb (156.5 kg)       Constitutional: Oriented to person, place, and time.    HENT: Head: Normocephalic and atraumatic. Neck: No JVD present. Cardiovascular: Regular rhythm. No murmur, gallop or rubs appreciated  Lung: Breath sounds normal. No respiratory distress. No ronchi or rales appreciated  Abdominal: No tenderness. No rebound and no guarding. Musculoskeletal: There is no lower extremity edema. No cynosis   right radial access site without any bleeding or hematoma. Slightly diminished pulse on the right radial access site. Motor function and sensation intact in right hand. No swelling of right forearm. Review of Data  LABS:   Lab Results   Component Value Date/Time    Sodium 141 03/10/2020 04:08 AM    Potassium 4.0 03/10/2020 04:08 AM    Chloride 107 03/10/2020 04:08 AM    CO2 27 03/10/2020 04:08 AM    Glucose 93 03/10/2020 04:08 AM    BUN 9 03/10/2020 04:08 AM    Creatinine 1.21 03/10/2020 04:08 AM     Lipids Latest Ref Rng & Units 3/10/2020   Chol, Total <200 MG/(H)   HDL 40 - 60 MG/DL 34(L)   LDL 0 - 100 MG/. 2(H)   Trig <150 MG/(H)   Chol/HDL Ratio 0 - 5.0   6.2(H)   Some recent data might be hidden     Lab Results   Component Value Date/Time    ALT (SGPT) 27 03/09/2020 10:46 AM     No results found for: HBA1C, HGBE8, UOF2TBTH, HTO2WJXZ, SUG5MDTX    EKG  Sinus rhythm at 73 bpm.  Nonspecific ST-T abnormality in inferolateral lead    ECHO    STRESS TEST (EST, PHARM, NUC, ECHO etc)    CATHETERIZATION (03/20)  Left Main   The vessel is angiographically normal.   Left Anterior Descending   Proximalmid eccentric 50-60% borderline narrowing. PEDRO-3 flow distally IFR of LAD performed which was 0.90, suggesting physiologically not significant stenosis   Prox LAD lesion 60% stenosed. . Pressure wire/iFR was perfromed on the lesion. Flow Reserve: 0.9. There is moderate plaque burden detected. Left Circumflex   The vessel exhibits minimal luminal irregularities. OM1: Minimal luminal irregularities otherwise normal   Right Coronary Artery   The vessel exhibits minimal luminal irregularities. IMPRESSION & PLAN:  Ms. Zena Stanley is 47-year-old female with multiple medical problem    CAD:  Patient had cardiac cath in March 2020. Proximal LAD had borderline 50-60% narrowing, IFR of LAD was 0.9 suggesting physiologically not significant stenosis  She will need to be on aggressive medical management. Continue with aspirin, amlodipine, Toprol  Sublingual nitroglycerin as needed    Hypertension:  Currently on Toprol-XL and amlodipine. Blood pressure is normal.  Continue same. She is also on Hyzaar    Obesity:  Max weight 475 pounds according to patient. She was able to lose weight and was 235 pound. Now she is again 347 pounds. Importance of diet discussed. Exercise program after cardiac work-up    Tobacco abuse disorder:  She smoked anywhere from half a pack to 1 pack of cigarettes a day. Trying to quit. Patient has been having some right upper extremity pain after cardiac catheterization. Radial pulse on right side is slightly diminished. Motor function and sensation is intact in right hand. No swelling of right upper extremity or right forearm. I have asked her to put warm compresses over right forearm and right radial site area for 1 week on a daily basis  I also talked to vascular surgery team Dr. Silvia Barnett and discussed the situation and he agree that as a motor function and sensation is intact, likely she does not need any intervention. We will send her to vascular surgery clinic for second opinion. Patient was advised to go to emergency department if she has any worsening of the pain, loss of sensation or motor function of right upper extremity or any swelling. This plan was discussed with patient who is in agreement. Thank you for allowing me to participate in patient care. Please feel free to call me if you have any question or concern. Dex Wynne MD  Please note: This document has been produced using voice recognition software.  Unrecognized errors in transcription may be present.

## 2020-03-16 NOTE — PROGRESS NOTES
1. Have you been to the ER, urgent care clinic since your last visit? Hospitalized since your last visit? No     2. Have you seen or consulted any other health care providers outside of the [address] [place] [address] since your last visit? Include any pap smears or colon screening.   No

## 2020-03-20 VITALS
RESPIRATION RATE: 20 BRPM | SYSTOLIC BLOOD PRESSURE: 111 MMHG | HEART RATE: 66 BPM | HEIGHT: 64 IN | WEIGHT: 293 LBS | DIASTOLIC BLOOD PRESSURE: 60 MMHG | OXYGEN SATURATION: 99 % | TEMPERATURE: 98.3 F | BODY MASS INDEX: 50.02 KG/M2

## 2020-03-27 ENCOUNTER — OFFICE VISIT (OUTPATIENT)
Dept: CARDIOLOGY CLINIC | Age: 45
End: 2020-03-27

## 2020-03-27 VITALS
OXYGEN SATURATION: 95 % | HEIGHT: 64 IN | DIASTOLIC BLOOD PRESSURE: 78 MMHG | WEIGHT: 293 LBS | RESPIRATION RATE: 20 BRPM | SYSTOLIC BLOOD PRESSURE: 110 MMHG | HEART RATE: 71 BPM | BODY MASS INDEX: 50.02 KG/M2

## 2020-03-27 DIAGNOSIS — M79.602 PAIN IN BOTH UPPER EXTREMITIES: Primary | ICD-10-CM

## 2020-03-27 DIAGNOSIS — M79.601 PAIN IN BOTH UPPER EXTREMITIES: Primary | ICD-10-CM

## 2020-03-27 NOTE — PROGRESS NOTES
Patient presents in office today as a new patient.       3 most recent PHQ Screens 3/27/2020   Little interest or pleasure in doing things Not at all   Feeling down, depressed, irritable, or hopeless Not at all   Total Score PHQ 2 0

## 2020-03-29 NOTE — PROGRESS NOTES
Angie Gonsales    Chief Complaint   Patient presents with   [facility] New Patient       History and Physical    Ms. Joey Mcdonough is a 51-year-old female everyday smoker who presents with right upper extremity discomfort after undergoing a cardiac catheterization. She states that before catheterization she had pain in her bilateral upper extremities with some tingling down her arms. She denies being diabetic. She states that since her catheterization she has noticed some increased discomfort in her right forearm area that is improving over the past few days. Warm compresses and taking NSAIDs. She denies any fevers or chills, swelling, or increased tingling of her hand. Past Medical History:   Diagnosis Date    ADHD     B[facility] disorder ([facility] [address]     CAD (coronary artery disease)     Cath (03.20). Prox LAD 50-60% borderline stenosis, physiologically not significant iFR 0.9    Carpal tunnel syndrome     DJD (degenerative joint disease)     Fibromyalgia     Hypertension     IBS (irritable bowel syndrome)     Rheumatoid arthritis ([facility] [address]     Tobacco abuse      Patient Active Problem List   Diagnosis Code    Unstable angina (HCC) I20.0    Obesity E66.9    AL (obstructive sleep apnea) G47.33    HTN (hypertension) I10    IBS (irritable bowel syndrome) K58.9    Rheumatoid arthritis (HCC) M06.9    Tobacco abuse Z72.0     Past Surgical History:   Procedure Laterality Date    HX HYSTERECTOMY       Current Outpatient Medications   Medication Sig Dispense Refill    atorvastatin (LIPITOR) 40 mg tablet Take 1 Tab by mouth nightly. 30 Tab 0    metoprolol succinate (TOPROL-XL) 50 mg XL tablet Take 1 Tab by mouth daily. 30 Tab 0    OLANZapine (ZYPREXA) 15 mg tablet Take 15 mg by mouth nightly. Indications: bipolar disorder in remission      chlorzoxazone (PARAFON FORTE) 500 mg tablet Take 500 mg by mouth. Indications: muscle spasm      oxybutynin (DITROPAN) 5 mg tablet Take 5 mg by mouth three (3) times daily. Indications: overactive bladder      nitroglycerin (NITROSTAT) 0.4 mg SL tablet 1 Tab by SubLINGual route every five (5) minutes as needed for Chest Pain. Up to 3 doses. 25 Tab 5    aspirin delayed-release 81 mg tablet Take 1 Tab by mouth daily. 30 Tab 5    amLODIPine (NORVASC) 10 mg tablet Take 1 Tab by mouth daily. 30 Tab 5    tiZANidine (ZANAFLEX) 4 mg capsule tizanidine 4 mg capsule      omeprazole (PRILOSEC) 40 mg capsule Take 1 Cap by mouth daily. 90 Cap 3    losartan/hydrochlorothiazide (HYZAAR PO) Take  by mouth.  GABAPENTIN PO Take 800 mg by mouth three (3) times daily.  quetiapine fumarate (SEROQUEL PO) Take 150 mg by mouth nightly. At bedtime      linaclotide (LINZESS PO) Take 145 mcg by mouth daily.  Indications: adjunct therapy for irritable bowel syndrome       Allergies   Allergen Reactions    Aspirin Hives    Pcn [Penicillins] Hives     Social History     Socioeconomic History    Marital status: UNKNOWN     Spouse name: Not on file    Number of children: Not on file    Years of education: Not on file    Highest education level: Not on file   Occupational History    Not on file   Social Needs    Financial resource strain: Not on file    Food insecurity     Worry: Not on file     Inability: Not on file    Transportation needs     Medical: Not on file     Non-medical: Not on file   Tobacco Use    Smoking status: Current Every Day Smoker     Packs/day: 0.25    Smokeless tobacco: Never Used   Substance and Sexual Activity    Alcohol use: Yes     Comment: rarely    Drug use: No    Sexual activity: Not on file   Lifestyle    Physical activity     Days per week: Not on file     Minutes per session: Not on file    Stress: Not on file   Relationships    Social connections     Talks on phone: Not on file     Gets together: Not on file     Attends Congregation service: Not on file     Active member of club or organization: Not on file     Attends meetings of clubs or organizations: Not on file     Relationship status: Not on file    Intimate partner violence     Fear of current or ex partner: Not on file     Emotionally abused: Not on file     Physically abused: Not on file     Forced sexual activity: Not on file   Other Topics Concern    Not on file   Social History Narrative    Not on file      No family history on file. Review of Systems    Review of Systems   Constitutional: Negative for chills, diaphoresis, fever, malaise/fatigue and weight loss. HENT: Negative for hearing loss and sore throat. Eyes: Negative for blurred vision, photophobia and redness. Respiratory: Negative for cough, hemoptysis, shortness of breath and wheezing. Cardiovascular: Negative for chest pain, palpitations and orthopnea. Gastrointestinal: Negative for abdominal pain, blood in stool, constipation, diarrhea, heartburn, nausea and vomiting. Genitourinary: Negative for dysuria, frequency, hematuria and urgency. Musculoskeletal: Negative for back pain and myalgias. Skin: Negative for itching and rash. Neurological: Negative for dizziness, speech change, focal weakness, weakness and headaches. Endo/Heme/Allergies: Does not bruise/bleed easily. Psychiatric/Behavioral: Negative for depression and suicidal ideas.             Physical Exam:    Visit Vitals  /78 (BP 1 Location: Left arm, BP Patient Position: Sitting)   Pulse 71   Resp 20   Ht 5' 4\" (1.626 m)   Wt 343 lb 12.8 oz (155.9 kg)   SpO2 95%   BMI 59.01 kg/m²      Physical Examination: General appearance - alert, well appearing, and in no distress and overweight  Mental status - alert, oriented to person, place, and time  Eyes - sclera anicteric, left eye normal, right eye normal  Ears - right ear normal, left ear normal  Nose - normal and patent, no erythema, discharge or polyps  Mouth - mucous membranes moist, pharynx normal without lesions  Neck - supple, no significant adenopathy  Lymphatics - no palpable lymphadenopathy  Chest - clear to auscultation, no wheezes, rales or rhonchi, symmetric air entry  Heart - normal rate and regular rhythm  Abdomen - soft, nontender, nondistended, no masses or organomegaly  Extremities -bilateral upper extremities warm well perfused. Palpable ulnar pulse on the right unable to palpate radial artery pulse on the right. Palpable radial pulse on the left. Monophasic radial artery signal with triphasic ulnar signal and triphasic palmar arch signal on the right    Impression and Plan:    ICD-10-CM ICD-9-CM    1. Pain in both upper extremities M79.601 729.5     M79.602       No orders of the defined types were placed in this encounter. I told Ms. Isaac Naranjo that it is possible that her radial artery has occluded since her cardiac catheterization. I explained however, that she has an intact palmar arch and that is why her symptoms have not worsened. I believe some of her form discomfort may just be inflammatory response in relation to the thrombosis of her radial artery. There is no indication for surgical intervention at this time. Normally, I would obtain an UE duplex to evaluate the flow in her radial artery, but since there would be no intervention planned and we are practicing social distancing we will not proceed with this. I told her that if her symptoms worsen she is to give our office a call. Otherwise we will re evaluate her flow once we are able to see non urgent patient's in the office. The treatment plan was reviewed with the patient in detail. The patient voiced understanding of this plan and all questions and concerns were addressed. The patient agrees with this plan. We discussed the signs and symptoms that would require earlier attention or intervention.      The patient was given educational material related to his/her visit and the patient has voiced understanding of the material.     I appreciate the opportunity to participate in the care of your patient. I will be sure to keep you informed of any subsequent changes in the treatment plan. If you have any questions or concerns, please feel free to contact me. Tricia Short MD    PLEASE NOTE:  This document has been produced using voice recognition software. Unrecognized errors in transcription may be present.

## 2020-04-02 RX ORDER — METOPROLOL SUCCINATE 50 MG/1
50 TABLET, EXTENDED RELEASE ORAL DAILY
Qty: 30 TAB | Refills: 6 | Status: SHIPPED | OUTPATIENT
Start: 2020-04-02 | End: 2020-11-09

## 2020-04-09 RX ORDER — ATORVASTATIN CALCIUM 40 MG/1
40 TABLET, FILM COATED ORAL
Qty: 30 TAB | Refills: 0 | Status: SHIPPED | OUTPATIENT
Start: 2020-04-09 | End: 2020-04-16

## 2020-04-16 RX ORDER — ATORVASTATIN CALCIUM 40 MG/1
TABLET, FILM COATED ORAL
Qty: 30 TAB | Refills: 5 | Status: SHIPPED | OUTPATIENT
Start: 2020-04-16 | End: 2020-05-11

## 2020-04-16 NOTE — TELEPHONE ENCOUNTER
PCP: Sam Wray MD    Last appt: 3/16/2020  Future Appointments   Date Time Provider Kyrie Wilson   9/24/2020  1:00 PM Naty Warren  [facility]       Requested Prescriptions     Pending Prescriptions Disp Refills    atorvastatin (LIPITOR) 40 mg tablet [Pharmacy Med Name: ATORVASTATIN 40 MG TABLET] 30 Tab 5     Sig: take 1 tablet by mouth nightly           Other Comments:   Dr Shorty Wilson pt.

## 2020-05-11 RX ORDER — ATORVASTATIN CALCIUM 40 MG/1
TABLET, FILM COATED ORAL
Qty: 30 TAB | Refills: 5 | Status: SHIPPED | OUTPATIENT
Start: 2020-05-11 | End: 2020-09-24 | Stop reason: SDUPTHER

## 2020-09-24 ENCOUNTER — OFFICE VISIT (OUTPATIENT)
Dept: CARDIOLOGY CLINIC | Age: 45
End: 2020-09-24
Payer: MEDICAID

## 2020-09-24 VITALS
DIASTOLIC BLOOD PRESSURE: 98 MMHG | HEART RATE: 72 BPM | OXYGEN SATURATION: 97 % | SYSTOLIC BLOOD PRESSURE: 184 MMHG | HEIGHT: 64 IN | TEMPERATURE: 97 F | WEIGHT: 293 LBS | BODY MASS INDEX: 50.02 KG/M2

## 2020-09-24 DIAGNOSIS — Z72.0 TOBACCO ABUSE: ICD-10-CM

## 2020-09-24 DIAGNOSIS — I10 ESSENTIAL HYPERTENSION: Primary | ICD-10-CM

## 2020-09-24 DIAGNOSIS — I25.10 CORONARY ARTERY DISEASE DUE TO LIPID RICH PLAQUE: ICD-10-CM

## 2020-09-24 DIAGNOSIS — I25.83 CORONARY ARTERY DISEASE DUE TO LIPID RICH PLAQUE: ICD-10-CM

## 2020-09-24 DIAGNOSIS — E66.01 MORBID OBESITY, UNSPECIFIED OBESITY TYPE (HCC): ICD-10-CM

## 2020-09-24 PROCEDURE — G8755 DIAS BP > OR = 90: HCPCS | Performed by: INTERNAL MEDICINE

## 2020-09-24 PROCEDURE — G8417 CALC BMI ABV UP PARAM F/U: HCPCS | Performed by: INTERNAL MEDICINE

## 2020-09-24 PROCEDURE — G8428 CUR MEDS NOT DOCUMENT: HCPCS | Performed by: INTERNAL MEDICINE

## 2020-09-24 PROCEDURE — G8753 SYS BP > OR = 140: HCPCS | Performed by: INTERNAL MEDICINE

## 2020-09-24 PROCEDURE — G8510 SCR DEP NEG, NO PLAN REQD: HCPCS | Performed by: INTERNAL MEDICINE

## 2020-09-24 PROCEDURE — 99214 OFFICE O/P EST MOD 30 MIN: CPT | Performed by: INTERNAL MEDICINE

## 2020-09-24 RX ORDER — METOPROLOL SUCCINATE 25 MG/1
25 TABLET, EXTENDED RELEASE ORAL DAILY
Qty: 90 TAB | Refills: 3 | Status: SHIPPED | OUTPATIENT
Start: 2020-09-24

## 2020-09-24 RX ORDER — AMLODIPINE BESYLATE 10 MG/1
10 TABLET ORAL DAILY
Qty: 90 TAB | Refills: 3 | Status: SHIPPED | OUTPATIENT
Start: 2020-09-24

## 2020-09-24 RX ORDER — ATORVASTATIN CALCIUM 80 MG/1
80 TABLET, FILM COATED ORAL DAILY
Qty: 90 TAB | Refills: 3 | Status: SHIPPED | OUTPATIENT
Start: 2020-09-24

## 2020-09-24 NOTE — PROGRESS NOTES
Tracie Edwards presents today for   Chief Complaint   Patient presents with    Follow-up       Tracie Edwards preferred language for health care discussion is english/other. Is someone accompanying this pt? no    Is the patient using any DME equipment during [address]? no    Depression Screening:  3 most recent PHQ Screens 9/24/2020   Little interest or pleasure in doing things Not at all   Feeling down, depressed, irritable, or hopeless Not at all   Total Score PHQ 2 0       Learning Assessment:  Learning Assessment 3/27/2020   PRIMARY LEARNER Patient   PRIMARY LANGUAGE ENGLISH   LEARNER PREFERENCE PRIMARY VIDEOS   ANSWERED BY patient   RELATIONSHIP SELF       Abuse Screening:  No flowsheet data found. Fall Risk  No flowsheet data found. Pt currently taking Anticoagulant therapy? no    Coordination of Care:  1. Have you been to the ER, urgent care clinic since your last visit? Hospitalized since your last visit? no    2. Have you seen or consulted any other health care providers outside of the [address] [place] [address] since your last visit? Include any pap smears or colon screening.  no

## 2020-09-24 NOTE — PROGRESS NOTES
[facility]    Ms. Paradise Jackson is a 39 y.o. female with multiple medical problem    Patient is here today for follow-up appointment. Unfortunate because of COVID-19 pandemic situation, she has been homebound and she is not able to perform any exercise. She has gained weight and because of that she has significant degenerative joint disease of the knee. She has significant pain at the knee joint. She is not taking any pain medication. She denies any unhealthy dietary habits. She has no appetite. Despite all this she has gained weight. She does not recall having any chest pain that required nitroglycerin over last 6 months. She is taking her medication regularly except she thinks that she is not taking amlodipine anymore. This was stopped by PCP and she does not know why. She does not recall having any side effects on the medication. Her blood pressure has been running high. Denies any nausea, vomiting, abdominal pain, fever, chills, sputum production. No hematuria or other bleeding complaints    Past Medical History:   Diagnosis Date    ADHD     Bipolar disorder ([facility] [address]     CAD (coronary artery disease)     Cath (03.20). Prox LAD 50-60% borderline stenosis, physiologically not significant iFR 0.9    Carpal tunnel syndrome     DJD (degenerative joint disease)     Fibromyalgia     Hypertension     IBS (irritable bowel syndrome)     Rheumatoid arthritis (HCC)     Tobacco abuse          Past Surgical History:   Procedure Laterality Date    HX HYSTERECTOMY         Current Outpatient Medications   Medication Sig    atorvastatin (LIPITOR) 40 mg tablet take 1 tablet by mouth nightly    metoprolol succinate (TOPROL-XL) 50 mg XL tablet Take 1 Tab by mouth daily.  OLANZapine (ZYPREXA) 15 mg tablet Take 15 mg by mouth nightly.  Indications: bipolar disorder in remission    chlorzoxazone (PARAFON FORTE) 500 mg tablet Take 500 mg by mouth. Indications: muscle spasm    oxybutynin (DITROPAN) 5 mg tablet Take 5 mg by mouth three (3) times daily. Indications: overactive bladder    nitroglycerin (NITROSTAT) 0.4 mg SL tablet 1 Tab by SubLINGual route every five (5) minutes as needed for Chest Pain. Up to 3 doses.  aspirin delayed-release 81 mg tablet Take 1 Tab by mouth daily.  amLODIPine (NORVASC) 10 mg tablet Take 1 Tab by mouth daily.  tiZANidine (ZANAFLEX) 4 mg capsule tizanidine 4 mg capsule    omeprazole (PRILOSEC) 40 mg capsule Take 1 Cap by mouth daily.  losartan/hydrochlorothiazide (HYZAAR PO) Take  by mouth.  GABAPENTIN PO Take 800 mg by mouth three (3) times daily.  quetiapine fumarate (SEROQUEL PO) Take 150 mg by mouth nightly. At bedtime    linaclotide (LINZESS PO) Take 145 mcg by mouth daily. Indications: adjunct therapy for irritable bowel syndrome     No current facility-administered medications for this visit. Allergies and Sensitivities:  Allergies   Allergen Reactions    Aspirin Hives    Pcn [Penicillins] Hives       Family History:  No family history on file. Social History:  Social History     Tobacco Use    Smoking status: Current Every Day Smoker     Packs/day: 0.25    Smokeless tobacco: Never Used   Substance Use Topics    Alcohol use: Yes     Comment: rarely    Drug use: No     She  reports that she has been smoking. She has been smoking about 0.25 packs per day. She has never used smokeless tobacco.  She  reports current alcohol use. Review of Systems:  Cardiac symptoms as noted above in HPI. All others negative. Denies fatigue, malaise, skin rash, joint pain, blurring vision, photophobia, neck pain, hemoptysis, chronic cough, nausea, vomiting, hematuria, burning micturition, BRBPR, chronic headaches.     Physical Exam:  BP Readings from Last 3 Encounters:   03/27/20 110/78   03/16/20 117/70   03/11/20 111/60         Pulse Readings from Last 3 Encounters:   03/27/20 71   03/16/20 77 03/11/20 66          Wt Readings from Last 3 Encounters:   03/27/20 343 lb 12.8 oz (155.9 kg)   03/16/20 347 lb (157.4 kg)   03/09/20 335 lb (152 kg)       Constitutional: Oriented to person, place, and time. HENT: Head: Normocephalic and atraumatic. Neck: No JVD present. Cardiovascular: Regular rhythm. No murmur, gallop or rubs appreciated  Lung: Breath sounds normal. No respiratory distress. No ronchi or rales appreciated  Abdominal: No tenderness. No rebound and no guarding. Musculoskeletal: There is no lower extremity edema. No cynosis    Review of Data  LABS:   Lab Results   Component Value Date/Time    Sodium 141 03/10/2020 04:08 AM    Potassium 4.0 03/10/2020 04:08 AM    Chloride 107 03/10/2020 04:08 AM    CO2 27 03/10/2020 04:08 AM    Glucose 93 03/10/2020 04:08 AM    BUN 9 03/10/2020 04:08 AM    Creatinine 1.21 03/10/2020 04:08 AM     Lipids Latest Ref Rng & Units 3/10/2020   Chol, Total <200 MG/(H)   HDL 40 - 60 MG/DL 34(L)   LDL 0 - 100 MG/. 2(H)   Trig <150 MG/(H)   Chol/HDL Ratio 0 - 5.0   6.2(H)   Some recent data might be hidden     Lab Results   Component Value Date/Time    ALT (SGPT) 27 03/09/2020 10:46 AM     No results found for: HBA1C, HGBE8, IKW7NJEW, ESA6ODKI, NAT4IISY    EKG  Sinus rhythm at 73 bpm.  Nonspecific ST-T abnormality in inferolateral lead    ECHO    STRESS TEST (EST, PHARM, NUC, ECHO etc)    CATHETERIZATION (03/20)  Left Main   The vessel is angiographically normal.   Left Anterior Descending   Proximalmid eccentric 50-60% borderline narrowing. PEDRO-3 flow distally IFR of LAD performed which was 0.91, suggesting physiologically not significant stenosis   Prox LAD lesion 60% stenosed. . Pressure wire/iFR was perfromed on the lesion. Flow Reserve: 0.9. There is moderate plaque burden detected. Left Circumflex   The vessel exhibits minimal luminal irregularities.  OM1: Minimal luminal irregularities otherwise normal   Right Coronary Artery   The vessel exhibits minimal luminal irregularities. IMPRESSION & PLAN:  Ms. Rob Booth is 26-year-old female with multiple medical problem    CAD:  Patient had cardiac cath in March 2020. Proximal LAD had borderline 60% narrowing, IFR of LAD was 0.91 suggesting physiologically not significant stenosis  She will need to be on aggressive medical management. Continue with aspirin, \ Toprol  Sublingual nitroglycerin as needed. Has not used nitroglycerin since last time  Will increase Toprol to 75 mg daily. Restarting amlodipine as she does not recall having any side effect of the medication. Hypertension:  Currently on Toprol-XL and Hyzaar. She is not taking amlodipine anymore. She is not sure the reason. I will restart amlodipine as she has significant hypertension. Blood pressure will be checked again in 2 weeks    Obesity:  Max weight 475 pounds according to patient. She was able to lose weight and was 235 pound. Now she is again 347-->366 pounds. Importance of diet and exercise discussed again    Tobacco abuse disorder:  She smoked anywhere from half a pack to 1 pack of cigarettes a day. Trying to quit. This plan was discussed with patient who is in agreement. Thank you for allowing me to participate in patient care. Please feel free to call me if you have any question or concern. Itzel Olivier MD  Please note: This document has been produced using voice recognition software. Unrecognized errors in transcription may be present.

## 2020-09-25 ENCOUNTER — APPOINTMENT (OUTPATIENT)
Dept: GENERAL RADIOLOGY | Age: 45
End: 2020-09-25
Attending: EMERGENCY MEDICINE
Payer: MEDICARE

## 2020-09-25 ENCOUNTER — HOSPITAL ENCOUNTER (EMERGENCY)
Age: 45
Discharge: HOME | End: 2020-09-25
Attending: STUDENT IN AN ORGANIZED HEALTH CARE EDUCATION/TRAINING PROGRAM
Payer: MEDICARE

## 2020-09-25 ENCOUNTER — APPOINTMENT (OUTPATIENT)
Dept: VASCULAR SURGERY | Age: 45
End: 2020-09-25
Attending: EMERGENCY MEDICINE
Payer: MEDICARE

## 2020-09-25 VITALS
TEMPERATURE: 97.6 F | OXYGEN SATURATION: 100 % | HEART RATE: 68 BPM | SYSTOLIC BLOOD PRESSURE: 161 MMHG | RESPIRATION RATE: 18 BRPM | DIASTOLIC BLOOD PRESSURE: 101 MMHG

## 2020-09-25 DIAGNOSIS — M25.461 EFFUSION OF RIGHT KNEE: ICD-10-CM

## 2020-09-25 DIAGNOSIS — M71.21 BAKER'S CYST OF KNEE, RIGHT: Primary | ICD-10-CM

## 2020-09-25 PROCEDURE — 93971 EXTREMITY STUDY: CPT

## 2020-09-25 PROCEDURE — 99282 EMERGENCY DEPT VISIT SF MDM: CPT

## 2020-09-25 PROCEDURE — 73564 X-RAY EXAM KNEE 4 OR MORE: CPT

## 2020-09-25 RX ORDER — SENNOSIDES 25 MG/1
TABLET, FILM COATED ORAL
Qty: 1 TUBE | Refills: 0 | Status: SHIPPED | OUTPATIENT
Start: 2020-09-25

## 2020-09-25 NOTE — ED PROVIDER NOTES
EMERGENCY DEPARTMENT HISTORY AND PHYSICAL EXAM    Date: 9/25/2020  Patient Name: Daisy Dhillon    History of Presenting Illness     Chief Complaint   Patient presents with    Knee Pain         History Provided By: Patient    Additional History (Context): Daisy Dhillon is a 39 y.o. female with hypertension, obesity, osteoarthritis and ADHD, bipolar who presents with right knee pain for about 3 months but swelling in her right lower leg for about 10 days. Denies chest pain shortness of breath or DVT. She says she is not had a menstrual cycle in over 20 years. Denies injury, numbness weakness. PCP: Shoaib Mendez MD    Current Outpatient Medications   Medication Sig Dispense Refill    lidocaine 5 % topical cream Apply  to affected area two (2) times daily as needed for Pain. 1 Tube 0    metoprolol succinate (TOPROL-XL) 25 mg XL tablet Take 1 Tab by mouth daily. To be taken with Toprol 50 mg XL to equal Toprol 75 mg total daily. 90 Tab 3    atorvastatin (LIPITOR) 80 mg tablet Take 1 Tab by mouth daily. 90 Tab 3    amLODIPine (NORVASC) 10 mg tablet Take 1 Tab by mouth daily. 90 Tab 3    metoprolol succinate (TOPROL-XL) 50 mg XL tablet Take 1 Tab by mouth daily. 30 Tab 6    OLANZapine (ZYPREXA) 15 mg tablet Take 15 mg by mouth nightly. Indications: bipolar disorder in remission      chlorzoxazone (PARAFON FORTE) 500 mg tablet Take 500 mg by mouth. Indications: muscle spasm      oxybutynin (DITROPAN) 5 mg tablet Take 5 mg by mouth three (3) times daily. Indications: overactive bladder      nitroglycerin (NITROSTAT) 0.4 mg SL tablet 1 Tab by SubLINGual route every five (5) minutes as needed for Chest Pain. Up to 3 doses. 25 Tab 5    aspirin delayed-release 81 mg tablet Take 1 Tab by mouth daily. 30 Tab 5    tiZANidine (ZANAFLEX) 4 mg capsule tizanidine 4 mg capsule      omeprazole (PRILOSEC) 40 mg capsule Take 1 Cap by mouth daily.  90 Cap 3    losartan/hydrochlorothiazide (HYZAAR PO) Take by mouth.  GABAPENTIN PO Take 800 mg by mouth three (3) times daily.  quetiapine fumarate (SEROQUEL PO) Take 150 mg by mouth nightly. At bedtime      linaclotide (LINZESS PO) Take 145 mcg by mouth daily. Indications: adjunct therapy for irritable bowel syndrome         Past History     Past Medical History:  Past Medical History:   Diagnosis Date    ADHD     Bipolar disorder ([address]     CAD (coronary artery disease)     Cath (03.20). Prox LAD 50-60% borderline stenosis, physiologically not significant iFR 0.9    Carpal tunnel syndrome     DJD (degenerative joint disease)     Fibromyalgia     Hypertension     IBS (irritable bowel syndrome)     Rheumatoid arthritis (HCC)     Tobacco abuse        Past Surgical History:  Past Surgical History:   Procedure Laterality Date    HX HYSTERECTOMY         Family History:  History reviewed. No pertinent family history. Social History:  Social History     Tobacco Use    Smoking status: Current Every Day Smoker     Packs/day: 0.25    Smokeless tobacco: Never Used   Substance Use Topics    Alcohol use: Yes     Comment: rarely    Drug use: No       Allergies: Allergies   Allergen Reactions    Aspirin Hives    Pcn [Penicillins] Hives         Review of Systems   Review of Systems   Constitutional: Negative for fever. Musculoskeletal: Positive for arthralgias and myalgias. All Other Systems Negative  Physical Exam     Vitals:    09/25/20 1439   BP: (!) 161/101   Pulse: 68   Resp: 18   Temp: 97.6 °F (36.4 °C)   SpO2: 100%     Physical Exam  Vitals signs and nursing note reviewed. Constitutional:       Appearance: She is well-developed. HENT:      Head: Normocephalic and atraumatic. Eyes:      Pupils: Pupils are equal, round, and reactive to light. Neck:      Thyroid: No thyromegaly. Vascular: No JVD. Trachea: No tracheal deviation. Cardiovascular:      Rate and Rhythm: Normal rate and regular rhythm.       Heart sounds: Normal heart sounds. No murmur. No friction rub. No gallop. Pulmonary:      Effort: Pulmonary effort is normal. No respiratory distress. Breath sounds: Normal breath sounds. No stridor. No wheezing or rales. Chest:      Chest wall: No tenderness. Abdominal:      General: There is no distension. Palpations: Abdomen is soft. There is no mass. Tenderness: There is no abdominal tenderness. There is no guarding or rebound. Musculoskeletal:         General: Tenderness present. Right lower leg: Edema present. Comments: Right knee:  Extension 0 flexion 90 with pain positive Yossi's. Negative Lockman varus valgus stressors. Posterior on lateral joint line tenderness as well as anterior medial joint line tenderness. Her right lower extremity is taut and tender in the calf. DP PT pulses palpable. No skin color changes temperature changes or open lesions. Lymphadenopathy:      Cervical: No cervical adenopathy. Skin:     General: Skin is warm and dry. Coloration: Skin is not pale. Findings: No erythema or rash. Neurological:      Mental Status: She is alert and oriented to person, place, and time. Psychiatric:         Behavior: Behavior normal.         Thought Content: Thought content normal.          Diagnostic Study Results     Labs -   No results found for this or any previous visit (from the past 12 hour(s)). Radiologic Studies -   XR KNEE RT MIN 4 V   Final Result   IMPRESSION:      Small joint effusion without evidence of fracture. CT Results  (Last 48 hours)    None        CXR Results  (Last 48 hours)    None            Medical Decision Making   I am the first provider for this patient. I reviewed the vital signs, available nursing notes, past medical history, past surgical history, family history and social history. Vital Signs-Reviewed the patient's vital signs.       Procedures:  Procedures    Provider Notes (Medical Decision Making): Knee x-ray and PVL of right lower extremity. Baker's cyst; effusion on PVL and x-rays. Treat pain topically and f/up with ortho. MED RECONCILIATION:  No current facility-administered medications for this encounter. Current Outpatient Medications   Medication Sig    lidocaine 5 % topical cream Apply  to affected area two (2) times daily as needed for Pain.  metoprolol succinate (TOPROL-XL) 25 mg XL tablet Take 1 Tab by mouth daily. To be taken with Toprol 50 mg XL to equal Toprol 75 mg total daily.  atorvastatin (LIPITOR) 80 mg tablet Take 1 Tab by mouth daily.  amLODIPine (NORVASC) 10 mg tablet Take 1 Tab by mouth daily.  metoprolol succinate (TOPROL-XL) 50 mg XL tablet Take 1 Tab by mouth daily.  OLANZapine (ZYPREXA) 15 mg tablet Take 15 mg by mouth nightly. Indications: bipolar disorder in remission    chlorzoxazone (PARAFON FORTE) 500 mg tablet Take 500 mg by mouth. Indications: muscle spasm    oxybutynin (DITROPAN) 5 mg tablet Take 5 mg by mouth three (3) times daily. Indications: overactive bladder    nitroglycerin (NITROSTAT) 0.4 mg SL tablet 1 Tab by SubLINGual route every five (5) minutes as needed for Chest Pain. Up to 3 doses.  aspirin delayed-release 81 mg tablet Take 1 Tab by mouth daily.  tiZANidine (ZANAFLEX) 4 mg capsule tizanidine 4 mg capsule    omeprazole (PRILOSEC) 40 mg capsule Take 1 Cap by mouth daily.  losartan/hydrochlorothiazide (HYZAAR PO) Take  by mouth.  GABAPENTIN PO Take 800 mg by mouth three (3) times daily.  quetiapine fumarate (SEROQUEL PO) Take 150 mg by mouth nightly. At bedtime    linaclotide (LINZESS PO) Take 145 mcg by mouth daily. Indications: adjunct therapy for irritable bowel syndrome       Disposition:  home    DISCHARGE NOTE:   4:26 PM    Pt has been reexamined. Patient has no new complaints, changes, or physical findings. Care plan outlined and precautions discussed. Results of PVL, x-ryas were reviewed with the patient.  All medications were reviewed with the patient; will d/c home with lido. All of pt's questions and concerns were addressed. Patient was instructed and agrees to follow up with PCP, as well as to return to the ED upon further deterioration. Patient is ready to go home. Follow-up Information     Follow up With Specialties Details Why Contact Info    Carlyn Gilmore MD Internal Medicine Schedule an appointment as soon as possible for a visit in 3 days  [place] [address]  [phone]      [facility] EMERGENCY DEPT Emergency Medicine  If symptoms worsen return immediately [address]  [phone]          Current Discharge Medication List      START taking these medications    Details   lidocaine 5 % topical cream Apply  to affected area two (2) times daily as needed for Pain. Qty: 1 Tube, Refills: 0             Diagnosis     Clinical Impression:   1. Baker's cyst of knee, right    2.  Effusion of right knee

## 2020-09-25 NOTE — ED TRIAGE NOTES
Bilateral knee pain for months. Lately the pain has become worse. Doctor tells her she has  arthritis but she does not agree.

## 2020-10-21 RX ORDER — ASPIRIN 81 MG/1
TABLET ORAL
Qty: 30 TAB | Refills: 5 | Status: SHIPPED | OUTPATIENT
Start: 2020-10-21 | End: 2021-04-19

## 2020-11-09 RX ORDER — METOPROLOL SUCCINATE 50 MG/1
TABLET, EXTENDED RELEASE ORAL
Qty: 210 TAB | Refills: 3 | Status: SHIPPED | OUTPATIENT
Start: 2020-11-09

## 2021-04-19 RX ORDER — ASPIRIN 81 MG/1
TABLET ORAL
Qty: 30 TAB | Refills: 5 | Status: SHIPPED | OUTPATIENT
Start: 2021-04-19

## 2021-08-15 NOTE — H&P
Internal Medicine History and Physical  Note           NAME:  Ilya Cottrell   :   1975   MRN:  [id number]     PCP:  Mihaela Linares MD     Date/Time:  3/9/2020 12:07 PM      I hereby certify this patient for admission based upon medical necessity as noted below :        Assessment / plan :        #  Unstable angina ([facility] [address] (3/9/2020). Admitted to telemetry unit. Serial trop . Prn troponin . Control pains, prn nitro , iv morphine or equivalent. EKG. ECHO. Cardiology consulted. Continue Heparin and nitro drips started in the emergency room pending cardiology consultation. # Morbid obesity (3/9/2020). Body mass index is 57.5 kg/m². # Systolic CMP. - Echo 20 with EF 35-50%, mild aortic regurgitation, no evidence of pulmonary hypertension     # History of AL (obstructive sleep apnea) (3/9/2020). Use home CPAP     #  HTN (hypertension) (3/9/2020)     # History of IBS (irritable bowel syndrome) (3/9/2020)    # History of rheumatoid arthritis ([facility] [address] (3/9/2020)     # Ongoing tobacco abuse (3/9/2020) She still smoke about 7 to 10 cigarettes a day      -DVT prophylaxis : Heparin.   - Code Status : Full    -Other chronic medical problems as per past history. Further management depend on the course of the case and expanded data base. DISPO - pt to be admitted at this time for reasons addressed above, continued hospitalization for ongoing assessment and treatment indicated        Subjective:     CHIEF COMPLAINT: Severe left-sided chest pain    HISTORY OF PRESENT ILLNESS:     Ms. Jany Dumont is a 39 y.o.  female who is admitted for chest pain possible unstable angina. Ms. Jany Dumont with past medical history as noted below , presented to the Emergency Department today  complaining of above. Patient presented to the emergency room with severe left-sided chest pain and dyspnea on exertion.   Today about 10 AM the chest pain was so severe, could not get her breath. Chest pain radiated to her back, down left arm and her neck. Seen in the emergency room here on 2 February last month for test pain and followed by the heart doctor Dr. Km Kee on 24th of last month she had nuclear stress test reported with some abnormality. ER MD called Dr. Km Kee who asked the patient to be admitted for further work-up. I talked to the cardiologist Dr. Km Kee who indicated he knows this patient, she is a frequent flyer to the ER and she had abnormal nuclear stress test and he is considering doing heart catheterization for her. Started on heparin and nitro drip in the ER. Patient told me that the chest pain ongoing for a month every day on on and off basis. Sometime has palpitation with it. She does not have cholesterol issues and she does not take medication for that. She reports sometimes when she move her left shoulder and hold her arm for some time that prescription or her left side chest..  She used CPAP at night. She still smoke about 7 to 10 cigarettes a day  Cardiac enzymes were negative in the ER. Past Medical History:   Diagnosis Date    ADHD     Bipolar disorder ([address]     Carpal tunnel syndrome     DJD (degenerative joint disease)     Fibromyalgia     Hypertension     IBS (irritable bowel syndrome)     Irregular heart beat     Rheumatoid arthritis (HCC)     Tobacco abuse         Past Surgical History:   Procedure Laterality Date    HX HYSTERECTOMY         Social History     Tobacco Use    Smoking status: Current Every Day Smoker     Packs/day: 0.25    Smokeless tobacco: Never Used   Substance Use Topics    Alcohol use: Yes     Comment: rarely        No family history on file. Allergies   Allergen Reactions    Aspirin Hives    Pcn [Penicillins] Hives        Prior to Admission medications    Medication Sig Start Date End Date Taking? Authorizing Provider   metoprolol succinate (TOPROL-XL) 25 mg XL tablet Take 1 Tab by mouth daily.  3/4/20  Yes Cole Ac MD   nitroglycerin (NITROSTAT) 0.4 mg SL tablet 1 Tab by SubLINGual route every five (5) minutes as needed for Chest Pain. Up to 3 doses. 2/24/20  Yes Cole Ac MD   aspirin delayed-release 81 mg tablet Take 1 Tab by mouth daily. 2/24/20  Yes Cole Ac MD   amLODIPine (NORVASC) 10 mg tablet Take 1 Tab by mouth daily. 2/24/20  Yes Cole Ac MD   losartan/hydrochlorothiazide (HYZAAR PO) Take  by mouth. Yes Other, MD Jase   GABAPENTIN PO Take  by mouth. Yes Blaine, MD Jase   quetiapine fumarate (SEROQUEL PO) Take  by mouth. Yes Blaine, MD Jase   linaclotide (LINZESS PO) Take  by mouth. Yes Blaine, MD Jase   celecoxib (CELEBREX PO) Take  by mouth. Yes Other, MD Jase   tiZANidine (ZANAFLEX) 4 mg capsule tizanidine 4 mg capsule    Other, MD Jase   omeprazole (PRILOSEC) 40 mg capsule Take 1 Cap by mouth daily.  2/2/20   Carolin Miner MD       Review of Systems:  (bold if positive, otherwise negative), apart from what mentioned in HPI  Apart from above patient deny any other significant complain  Gen:  Weight gain, weight loss, fever, chills, fatigue  Eyes:  Visual changes, pain, conjunctivitis  ENT:  Sore throat, rhinorrhea, decreased hearing  CVS:  Palpitations, chest pain, dizziness, syncope, edema, PND  Pulm:  Cough, dyspnea, sputum, hemoptysis, wheezing  GI:  Abdominal pain, nausea, emesis, diarrhea, constipation, GERD, melena  :  Hematuria, incontinence, nocturia, dysuria, discharge  MS:  Pain, weakness, swelling, arthritis  Skin:  Rash, erythema, abscess, wound, moles  Endo:  Heat intolerance, cold intolerance, weight gain, polyuria  Hem:  Enlarged nodes, bruising, bleeding, night sweats  Renal:  Edema, change in urine, flank pain  Neuro:  Numbness, tingling, weakness, seizure, headache, tremors       VITALS:    Vital signs reviewed; most recent are:    Visit Vitals  BP (!) 127/93   Pulse 78   Temp 98 °F (36.7 °C)   Resp 16   Ht 5' 4\" (1.626 m)   Wt 152 kg (335 lb)   SpO2 97%   BMI 57.50 kg/m²     SpO2 Readings from Last 6 Encounters:   03/09/20 97%   02/24/20 97%   02/02/20 99%   11/24/18 100%   09/07/18 100%        No intake or output data in the 24 hours ending 03/09/20 1325     Physical Exam:     Gen:  Appear stated age, Well-developed, well-nourished, in no acute distress  HEENT:  Head atraumatic, normocephalic , hearing intact to voice, moist mucous membranes. Neck:  Large thick neck trachea midline , No apparent JVD, Supple   Resp:  No accessory muscle use,Bilateral BS present, clear breath sounds without wheezes rales or rhonchi  Card:    normal S1, S2 without thrills, bruits or significant lower leg peripheral edema but obese legS. Abd:  Soft, non-tender, non-distended, normoactive bowel sounds are present, no palpable organomegaly  that I could appreciate  Musc:  No cyanosis or clubbing. Skin:  No rashes or ulcers, skin turgor is good. Neuro:  Cranial nerves are grossly intact, no clear area of focal motor weakness, follows commands appropriately. Psych:  oriented to person, place and time, alert. Labs: All Cardiac Markers in the last 24 hours:   Lab Results   Component Value Date/Time    TROIQ <0.02 03/09/2020 10:46 AM       Recent Labs     03/09/20  1046   WBC 14.2*   HGB 14.1   HCT 43.1        Recent Labs     03/09/20  1046      K 3.7      CO2 34*   *   BUN 11   CREA 1.25   CA 9.4   ALB 3.7   TBILI 0.2   SGOT 16   ALT 27     No results found for: GLUCPOC  No results for input(s): PH, PCO2, PO2, HCO3, FIO2 in the last 72 hours. No results for input(s): INR, INREXT, INREXT in the last 72 hours. Xr Chest Port    Result Date: 3/9/2020  IMPRESSION:  1. No acute pulmonary disease. Please refer to radiology reports.       Telemetry reviewed:   normal sinus rhythm    Risk of deterioration: high      Total time spent in the care of this patient: [phone] [facility] discussed with: Patient, Nursing Staff, Consultant/Specialist, >50% of time spent in counseling and coordination of care and ER provider/staff, nursing      Discussed:  Care Plan       I have personally reviewed all pertinent labs, films and EKGs that have officially resulted. I reviewed available pertaining electronic documentation outlining the initial presentation as well as the emergency room physician's encounter. This document in whole or part of it has been produced using voice recognition software. Unrecognized errors in transcription may be present.     Attending Physician: Koby Turner MD no

## (undated) DEVICE — ANGIO-SEAL VIP VASCULAR CLOSURE DEVICE: Brand: ANGIO-SEAL

## (undated) DEVICE — CATHETER GUID AD 6FR L100CM COR PERIPH GRN NYL PTFE XB L 3

## (undated) DEVICE — INTRODUCER SHTH 6FR CANN L11CM W/ MINI GWIRE UNIQUE SLIX

## (undated) DEVICE — BAND RADIAL COMPR ARTERY 27CM -- LNG BX/10

## (undated) DEVICE — VALVE ANGIO ID0.11IN HEMSTAT MTL GUID WIRE INSRT TOOL AND

## (undated) DEVICE — CATH GUID COR EB35 5FR 100CM -- LAUNCHER

## (undated) DEVICE — SURGICAL PROCEDURE KIT LT HRT CUST

## (undated) DEVICE — HI-TORQUE VERSACORE FLOPPY GUIDE WIRE SYSTEM 145 CM: Brand: HI-TORQUE VERSACORE

## (undated) DEVICE — PRESSURE MONITORING SET: Brand: TRUWAVE

## (undated) DEVICE — SHEATH INTRO 5FR L11CM EVAR S STL FLX AND KINK RESIST CANN

## (undated) DEVICE — RADIFOCUS OPTITORQUE ANGIOGRAPHIC CATHETER: Brand: OPTITORQUE

## (undated) DEVICE — GUIDEWIRE VASC L185CM DIA0.014IN HYDRPHLC PTFE STR TIP

## (undated) DEVICE — PERCUTANEOUS ENTRY THINWALL NEEDLE  ONE-PART: Brand: COOK

## (undated) DEVICE — GLIDESHEATH SLENDER STAINLESS STEEL KIT: Brand: GLIDESHEATH SLENDER

## (undated) DEVICE — SET ANGIO L6.5IN L BOR 3 W STPCOCK SPIK TBNG

## (undated) DEVICE — COPILOT BLEEDBACK CONTROL VALVE: Brand: COPILOT

## (undated) DEVICE — CATHETER GUID 6FR L100CM GRN PTFE XBLAD3.5 TRUELUMEN HYBRID